# Patient Record
Sex: MALE | Race: WHITE | HISPANIC OR LATINO | Employment: OTHER | URBAN - METROPOLITAN AREA
[De-identification: names, ages, dates, MRNs, and addresses within clinical notes are randomized per-mention and may not be internally consistent; named-entity substitution may affect disease eponyms.]

---

## 2019-09-09 PROCEDURE — 99284 EMERGENCY DEPT VISIT MOD MDM: CPT | Mod: 25

## 2019-09-09 PROCEDURE — 96374 THER/PROPH/DIAG INJ IV PUSH: CPT

## 2019-09-10 ENCOUNTER — HOSPITAL ENCOUNTER (EMERGENCY)
Facility: HOSPITAL | Age: 59
Discharge: HOME | End: 2019-09-10
Attending: EMERGENCY MEDICINE
Payer: MEDICAID

## 2019-09-10 VITALS
DIASTOLIC BLOOD PRESSURE: 80 MMHG | TEMPERATURE: 99 F | RESPIRATION RATE: 17 BRPM | HEART RATE: 83 BPM | WEIGHT: 225 LBS | SYSTOLIC BLOOD PRESSURE: 169 MMHG | OXYGEN SATURATION: 95 % | HEIGHT: 67 IN | BODY MASS INDEX: 35.31 KG/M2

## 2019-09-10 DIAGNOSIS — S76.211A GROIN STRAIN, RIGHT, INITIAL ENCOUNTER: Primary | ICD-10-CM

## 2019-09-10 DIAGNOSIS — M79.606 LEG PAIN: ICD-10-CM

## 2019-09-10 DIAGNOSIS — I10 HYPERTENSION, UNSPECIFIED TYPE: ICD-10-CM

## 2019-09-10 LAB
ALBUMIN SERPL BCP-MCNC: 4.3 G/DL (ref 3.5–5.2)
ALP SERPL-CCNC: 92 U/L (ref 55–135)
ALT SERPL W/O P-5'-P-CCNC: 54 U/L (ref 10–44)
ANION GAP SERPL CALC-SCNC: 14 MMOL/L (ref 8–16)
AST SERPL-CCNC: 32 U/L (ref 10–40)
BASOPHILS # BLD AUTO: 0.02 K/UL (ref 0–0.2)
BASOPHILS NFR BLD: 0.3 % (ref 0–1.9)
BILIRUB SERPL-MCNC: 0.4 MG/DL (ref 0.1–1)
BILIRUB UR QL STRIP: NEGATIVE
BUN SERPL-MCNC: 16 MG/DL (ref 6–20)
CALCIUM SERPL-MCNC: 9.4 MG/DL (ref 8.7–10.5)
CHLORIDE SERPL-SCNC: 105 MMOL/L (ref 95–110)
CK SERPL-CCNC: 126 U/L (ref 20–200)
CLARITY UR: CLEAR
CO2 SERPL-SCNC: 23 MMOL/L (ref 23–29)
COLOR UR: YELLOW
CREAT SERPL-MCNC: 0.8 MG/DL (ref 0.5–1.4)
DIFFERENTIAL METHOD: NORMAL
EOSINOPHIL # BLD AUTO: 0.1 K/UL (ref 0–0.5)
EOSINOPHIL NFR BLD: 1 % (ref 0–8)
ERYTHROCYTE [DISTWIDTH] IN BLOOD BY AUTOMATED COUNT: 13.1 % (ref 11.5–14.5)
EST. GFR  (AFRICAN AMERICAN): >60 ML/MIN/1.73 M^2
EST. GFR  (NON AFRICAN AMERICAN): >60 ML/MIN/1.73 M^2
GLUCOSE SERPL-MCNC: 122 MG/DL (ref 70–110)
GLUCOSE UR QL STRIP: NEGATIVE
HCT VFR BLD AUTO: 41.8 % (ref 40–54)
HGB BLD-MCNC: 14.3 G/DL (ref 14–18)
HGB UR QL STRIP: NEGATIVE
KETONES UR QL STRIP: NEGATIVE
LEUKOCYTE ESTERASE UR QL STRIP: NEGATIVE
LYMPHOCYTES # BLD AUTO: 1.5 K/UL (ref 1–4.8)
LYMPHOCYTES NFR BLD: 21.3 % (ref 18–48)
MCH RBC QN AUTO: 30.8 PG (ref 27–31)
MCHC RBC AUTO-ENTMCNC: 34.2 G/DL (ref 32–36)
MCV RBC AUTO: 90 FL (ref 82–98)
MONOCYTES # BLD AUTO: 0.5 K/UL (ref 0.3–1)
MONOCYTES NFR BLD: 6.7 % (ref 4–15)
NEUTROPHILS # BLD AUTO: 4.9 K/UL (ref 1.8–7.7)
NEUTROPHILS NFR BLD: 70.7 % (ref 38–73)
NITRITE UR QL STRIP: NEGATIVE
PH UR STRIP: 7 [PH] (ref 5–8)
PLATELET # BLD AUTO: 196 K/UL (ref 150–350)
PMV BLD AUTO: 9.9 FL (ref 9.2–12.9)
POTASSIUM SERPL-SCNC: 3 MMOL/L (ref 3.5–5.1)
PROT SERPL-MCNC: 7.9 G/DL (ref 6–8.4)
PROT UR QL STRIP: NEGATIVE
RBC # BLD AUTO: 4.65 M/UL (ref 4.6–6.2)
SODIUM SERPL-SCNC: 142 MMOL/L (ref 136–145)
SP GR UR STRIP: 1.02 (ref 1–1.03)
URN SPEC COLLECT METH UR: NORMAL
UROBILINOGEN UR STRIP-ACNC: NEGATIVE EU/DL
WBC # BLD AUTO: 6.99 K/UL (ref 3.9–12.7)

## 2019-09-10 PROCEDURE — 63600175 PHARM REV CODE 636 W HCPCS: Performed by: EMERGENCY MEDICINE

## 2019-09-10 PROCEDURE — 25000003 PHARM REV CODE 250: Performed by: EMERGENCY MEDICINE

## 2019-09-10 PROCEDURE — 80053 COMPREHEN METABOLIC PANEL: CPT

## 2019-09-10 PROCEDURE — 85025 COMPLETE CBC W/AUTO DIFF WBC: CPT

## 2019-09-10 PROCEDURE — 81003 URINALYSIS AUTO W/O SCOPE: CPT

## 2019-09-10 PROCEDURE — 82550 ASSAY OF CK (CPK): CPT

## 2019-09-10 RX ORDER — KETOROLAC TROMETHAMINE 30 MG/ML
30 INJECTION, SOLUTION INTRAMUSCULAR; INTRAVENOUS
Status: COMPLETED | OUTPATIENT
Start: 2019-09-10 | End: 2019-09-10

## 2019-09-10 RX ORDER — POTASSIUM CHLORIDE 20 MEQ/1
40 TABLET, EXTENDED RELEASE ORAL
Status: COMPLETED | OUTPATIENT
Start: 2019-09-10 | End: 2019-09-10

## 2019-09-10 RX ORDER — NAPROXEN 500 MG/1
500 TABLET ORAL 2 TIMES DAILY WITH MEALS
Qty: 20 TABLET | Refills: 0 | Status: SHIPPED | OUTPATIENT
Start: 2019-09-10 | End: 2019-09-15

## 2019-09-10 RX ORDER — CYCLOBENZAPRINE HCL 10 MG
10 TABLET ORAL 3 TIMES DAILY PRN
Qty: 20 TABLET | Refills: 0 | Status: SHIPPED | OUTPATIENT
Start: 2019-09-10 | End: 2019-09-20

## 2019-09-10 RX ORDER — AMLODIPINE BESYLATE 5 MG/1
10 TABLET ORAL
Status: COMPLETED | OUTPATIENT
Start: 2019-09-10 | End: 2019-09-10

## 2019-09-10 RX ADMIN — AMLODIPINE BESYLATE 10 MG: 5 TABLET ORAL at 04:09

## 2019-09-10 RX ADMIN — POTASSIUM CHLORIDE 40 MEQ: 1500 TABLET, EXTENDED RELEASE ORAL at 04:09

## 2019-09-10 RX ADMIN — KETOROLAC TROMETHAMINE 30 MG: 30 INJECTION, SOLUTION INTRAMUSCULAR; INTRAVENOUS at 04:09

## 2019-09-10 NOTE — ED PROVIDER NOTES
Encounter Date: 9/9/2019    SCRIBE #1 NOTE: I, Alka Henson, am scribing for, and in the presence of,  Tim Hernandez MD. I have scribed the following portions of the note - Other sections scribed: HPI, ROS, PE.       History     Chief Complaint   Patient presents with    Leg Pain     Pt reports pain in his inner right thight that has been intermittent x1. Reports area may be a little swollen. Reports he played golf close to when the pain started but has a hx of blood clots     CC: Leg Pain    HPI: The patient is a 58 y.o male who presents to the ED complaining of RLE pain that began 2 weeks ago, went away and returned yesterday. Pain is located in the medial right thigh. Patient states that pain may have been exacerbated by excessive golf playing yesterday. Pain is also exacerbated when he extends the groin muscle. He denies taking pain medication to alleviate symptoms. He also reports of bilateral lower extremity swelling for the past 9 months. Hx of PE, possibly caused by thrombus from left knee complication. He was on a blood thinners for 3 yrs. Patient states that he walks often. No SOB or orthopnea. Patient admits to gaining 24 lbs in the last 4 months due to diet change. PMHx of HTN for which he takes amlodipine. No Hx of renal, liver, or cardiac complications.         The history is provided by the patient. No  was used.     Review of patient's allergies indicates:  No Known Allergies  Past Medical History:   Diagnosis Date    Anxiety     HTN (hypertension)     Hypercholesteremia     Pulmonary embolism      History reviewed. No pertinent surgical history.  History reviewed. No pertinent family history.  Social History     Tobacco Use    Smoking status: Former Smoker    Smokeless tobacco: Never Used   Substance Use Topics    Alcohol use: No    Drug use: Never     Review of Systems   Constitutional: Negative for fever.   HENT: Negative for sore throat.    Respiratory: Negative  for shortness of breath.    Cardiovascular: Negative for chest pain.   Gastrointestinal: Negative for nausea.   Genitourinary: Negative for dysuria.   Musculoskeletal: Positive for myalgias (Left medial thigh). Negative for back pain.   Skin: Negative for rash.   Neurological: Negative for weakness.   Psychiatric/Behavioral: Negative for confusion.       Physical Exam     Initial Vitals [09/09/19 2341]   BP Pulse Resp Temp SpO2   (!) 206/93 86 16 100.2 °F (37.9 °C) 97 %      MAP       --         Physical Exam    Nursing note and vitals reviewed.  Constitutional: He appears well-developed and well-nourished. He is not diaphoretic. No distress.   HENT:   Head: Normocephalic and atraumatic.   Eyes: Conjunctivae and EOM are normal. Pupils are equal, round, and reactive to light.   Neck: Normal range of motion. Neck supple.   Cardiovascular: Normal rate and regular rhythm.   Pulmonary/Chest: Breath sounds normal. No respiratory distress.   Abdominal: Soft. Bowel sounds are normal.   Musculoskeletal: Normal range of motion. He exhibits no edema or tenderness.   Tenderness at right adductor muscle insertion point.  No mass. No erythema.  No fluctuance.   Neurological: He is alert and oriented to person, place, and time.   Skin: Skin is warm and dry.   Psychiatric: He has a normal mood and affect.         ED Course   Procedures  Labs Reviewed   COMPREHENSIVE METABOLIC PANEL - Abnormal; Notable for the following components:       Result Value    Potassium 3.0 (*)     Glucose 122 (*)     ALT 54 (*)     All other components within normal limits   CBC W/ AUTO DIFFERENTIAL   CK   URINALYSIS, REFLEX TO URINE CULTURE    Narrative:     Preferred Collection Type->Urine, Clean Catch          Imaging Results          US Lower Extremity Veins Right (Final result)  Result time 09/10/19 03:04:18    Final result by Reyes Ye MD (09/10/19 03:04:18)                 Impression:      No evidence of deep venous thrombosis in the right  lower extremity.      Electronically signed by: Reyes Ye MD  Date:    09/10/2019  Time:    03:04             Narrative:    EXAMINATION:  US LOWER EXTREMITY VEINS RIGHT    CLINICAL HISTORY:  Pain in leg, unspecified.    TECHNIQUE:  Duplex and color flow Doppler evaluation and graded compression of the right lower extremity veins was performed.    COMPARISON:  None.    FINDINGS:  Right thigh veins: The common femoral, femoral, popliteal, upper greater saphenous, and deep femoral veins are patent and free of thrombus. The veins are normally compressible and have normal phasic flow and augmentation response.    Right calf veins: The visualized calf veins are patent.    Contralateral CFV: The contralateral (left) common femoral vein is patent and free of thrombus.    Miscellaneous: Mild soft tissue edema in the right calf.                                 Medical Decision Making:   History:   Old Medical Records: I decided to obtain old medical records.   Ultrasound shows no evidence of DVT.  Lab work shows no abnormalities of the hypokalemia.  P.o. potassium given the emergency room.  Patient's blood pressure trending downward.  He has not taking his Norvasc today.  Normally takes at night.  Will give his Norvasc as well.  Will treat with anti-inflammatories and muscle relaxers and follow up with primary care or Orthopedics.          Scribe Attestation:   Scribe #1: I performed the above scribed service and the documentation accurately describes the services I performed. I attest to the accuracy of the note.              I, Tim Hernandez, personally performed the services described in this documentation. All medical record entries made by the scribe were at my direction and in my presence.  I have reviewed the chart and agree that the record reflects my personal performance and is accurate and complete.    Clinical Impression:       ICD-10-CM ICD-9-CM   1. Groin strain, right, initial encounter S76.211A 848.8    2. Leg pain M79.606 729.5   3. Hypertension, unspecified type I10 401.9                                Tim Hernandez MD  09/10/19 0418

## 2021-02-17 ENCOUNTER — HOSPITAL ENCOUNTER (EMERGENCY)
Facility: HOSPITAL | Age: 61
Discharge: HOME | End: 2021-02-17
Attending: EMERGENCY MEDICINE
Payer: MEDICAID

## 2021-02-17 VITALS
RESPIRATION RATE: 17 BRPM | BODY MASS INDEX: 36.1 KG/M2 | WEIGHT: 230 LBS | HEIGHT: 67 IN | OXYGEN SATURATION: 95 % | SYSTOLIC BLOOD PRESSURE: 165 MMHG | HEART RATE: 83 BPM | DIASTOLIC BLOOD PRESSURE: 79 MMHG | TEMPERATURE: 99 F

## 2021-02-17 DIAGNOSIS — R01.1 MURMUR, HEART: Primary | ICD-10-CM

## 2021-02-17 DIAGNOSIS — R07.9 CHEST PAIN: ICD-10-CM

## 2021-02-17 DIAGNOSIS — E87.6 HYPOKALEMIA: ICD-10-CM

## 2021-02-17 DIAGNOSIS — I10 HYPERTENSION, UNSPECIFIED TYPE: ICD-10-CM

## 2021-02-17 LAB
ALBUMIN SERPL BCP-MCNC: 4.4 G/DL (ref 3.5–5.2)
ALP SERPL-CCNC: 101 U/L (ref 55–135)
ALT SERPL W/O P-5'-P-CCNC: 32 U/L (ref 10–44)
ANION GAP SERPL CALC-SCNC: 15 MMOL/L (ref 8–16)
AST SERPL-CCNC: 24 U/L (ref 10–40)
BASOPHILS # BLD AUTO: 0.04 K/UL (ref 0–0.2)
BASOPHILS NFR BLD: 0.5 % (ref 0–1.9)
BILIRUB SERPL-MCNC: 0.5 MG/DL (ref 0.1–1)
BILIRUB UR QL STRIP: NEGATIVE
BNP SERPL-MCNC: <10 PG/ML (ref 0–99)
BUN SERPL-MCNC: 11 MG/DL (ref 6–20)
CALCIUM SERPL-MCNC: 9.3 MG/DL (ref 8.7–10.5)
CHLORIDE SERPL-SCNC: 103 MMOL/L (ref 95–110)
CLARITY UR: CLEAR
CO2 SERPL-SCNC: 26 MMOL/L (ref 23–29)
COLOR UR: YELLOW
CREAT SERPL-MCNC: 0.8 MG/DL (ref 0.5–1.4)
CTP QC/QA: YES
D DIMER PPP IA.FEU-MCNC: 0.28 MG/L FEU
DIFFERENTIAL METHOD: ABNORMAL
EOSINOPHIL # BLD AUTO: 0.2 K/UL (ref 0–0.5)
EOSINOPHIL NFR BLD: 1.9 % (ref 0–8)
ERYTHROCYTE [DISTWIDTH] IN BLOOD BY AUTOMATED COUNT: 12.3 % (ref 11.5–14.5)
EST. GFR  (AFRICAN AMERICAN): >60 ML/MIN/1.73 M^2
EST. GFR  (NON AFRICAN AMERICAN): >60 ML/MIN/1.73 M^2
GLUCOSE SERPL-MCNC: 100 MG/DL (ref 70–110)
GLUCOSE UR QL STRIP: NEGATIVE
HCT VFR BLD AUTO: 44.3 % (ref 40–54)
HGB BLD-MCNC: 15.6 G/DL (ref 14–18)
HGB UR QL STRIP: NEGATIVE
IMM GRANULOCYTES # BLD AUTO: 0.06 K/UL (ref 0–0.04)
IMM GRANULOCYTES NFR BLD AUTO: 0.7 % (ref 0–0.5)
KETONES UR QL STRIP: NEGATIVE
LEUKOCYTE ESTERASE UR QL STRIP: NEGATIVE
LYMPHOCYTES # BLD AUTO: 2.3 K/UL (ref 1–4.8)
LYMPHOCYTES NFR BLD: 28.3 % (ref 18–48)
MCH RBC QN AUTO: 31 PG (ref 27–31)
MCHC RBC AUTO-ENTMCNC: 35.2 G/DL (ref 32–36)
MCV RBC AUTO: 88 FL (ref 82–98)
MONOCYTES # BLD AUTO: 0.5 K/UL (ref 0.3–1)
MONOCYTES NFR BLD: 6.7 % (ref 4–15)
NEUTROPHILS # BLD AUTO: 5 K/UL (ref 1.8–7.7)
NEUTROPHILS NFR BLD: 61.9 % (ref 38–73)
NITRITE UR QL STRIP: NEGATIVE
NRBC BLD-RTO: 0 /100 WBC
PH UR STRIP: 7 [PH] (ref 5–8)
PLATELET # BLD AUTO: 209 K/UL (ref 150–350)
PMV BLD AUTO: 10.8 FL (ref 9.2–12.9)
POTASSIUM SERPL-SCNC: 2.6 MMOL/L (ref 3.5–5.1)
PROT SERPL-MCNC: 8.5 G/DL (ref 6–8.4)
PROT UR QL STRIP: ABNORMAL
RBC # BLD AUTO: 5.03 M/UL (ref 4.6–6.2)
SARS-COV-2 RDRP RESP QL NAA+PROBE: NEGATIVE
SODIUM SERPL-SCNC: 144 MMOL/L (ref 136–145)
SP GR UR STRIP: 1.01 (ref 1–1.03)
TROPONIN I SERPL DL<=0.01 NG/ML-MCNC: 0.01 NG/ML (ref 0–0.03)
TROPONIN I SERPL DL<=0.01 NG/ML-MCNC: 0.01 NG/ML (ref 0–0.03)
URN SPEC COLLECT METH UR: ABNORMAL
UROBILINOGEN UR STRIP-ACNC: NEGATIVE EU/DL
WBC # BLD AUTO: 8.07 K/UL (ref 3.9–12.7)

## 2021-02-17 PROCEDURE — 85379 FIBRIN DEGRADATION QUANT: CPT

## 2021-02-17 PROCEDURE — 93010 ELECTROCARDIOGRAM REPORT: CPT | Mod: ,,, | Performed by: INTERNAL MEDICINE

## 2021-02-17 PROCEDURE — 81003 URINALYSIS AUTO W/O SCOPE: CPT

## 2021-02-17 PROCEDURE — 85025 COMPLETE CBC W/AUTO DIFF WBC: CPT

## 2021-02-17 PROCEDURE — 80053 COMPREHEN METABOLIC PANEL: CPT

## 2021-02-17 PROCEDURE — 93010 EKG 12-LEAD: ICD-10-PCS | Mod: ,,, | Performed by: INTERNAL MEDICINE

## 2021-02-17 PROCEDURE — 96361 HYDRATE IV INFUSION ADD-ON: CPT

## 2021-02-17 PROCEDURE — 99285 EMERGENCY DEPT VISIT HI MDM: CPT | Mod: 25

## 2021-02-17 PROCEDURE — 96366 THER/PROPH/DIAG IV INF ADDON: CPT

## 2021-02-17 PROCEDURE — 63600175 PHARM REV CODE 636 W HCPCS: Performed by: EMERGENCY MEDICINE

## 2021-02-17 PROCEDURE — 93005 ELECTROCARDIOGRAM TRACING: CPT

## 2021-02-17 PROCEDURE — 84484 ASSAY OF TROPONIN QUANT: CPT

## 2021-02-17 PROCEDURE — U0002 COVID-19 LAB TEST NON-CDC: HCPCS | Performed by: EMERGENCY MEDICINE

## 2021-02-17 PROCEDURE — 83880 ASSAY OF NATRIURETIC PEPTIDE: CPT

## 2021-02-17 PROCEDURE — 25000003 PHARM REV CODE 250: Performed by: EMERGENCY MEDICINE

## 2021-02-17 PROCEDURE — 96365 THER/PROPH/DIAG IV INF INIT: CPT

## 2021-02-17 RX ORDER — POTASSIUM CHLORIDE 20 MEQ/1
20 TABLET, EXTENDED RELEASE ORAL DAILY
Qty: 10 TABLET | Refills: 0 | Status: SHIPPED | OUTPATIENT
Start: 2021-02-17 | End: 2021-02-27

## 2021-02-17 RX ORDER — SODIUM CHLORIDE 9 MG/ML
INJECTION, SOLUTION INTRAVENOUS
Status: COMPLETED | OUTPATIENT
Start: 2021-02-17 | End: 2021-02-17

## 2021-02-17 RX ORDER — NAPROXEN SODIUM 220 MG/1
81 TABLET, FILM COATED ORAL
Status: COMPLETED | OUTPATIENT
Start: 2021-02-17 | End: 2021-02-17

## 2021-02-17 RX ORDER — POTASSIUM CHLORIDE 7.45 MG/ML
10 INJECTION INTRAVENOUS
Status: COMPLETED | OUTPATIENT
Start: 2021-02-17 | End: 2021-02-17

## 2021-02-17 RX ADMIN — SODIUM CHLORIDE: 0.9 INJECTION, SOLUTION INTRAVENOUS at 03:02

## 2021-02-17 RX ADMIN — POTASSIUM CHLORIDE 10 MEQ: 7.46 INJECTION, SOLUTION INTRAVENOUS at 04:02

## 2021-02-17 RX ADMIN — ASPIRIN 81 MG: 81 TABLET, CHEWABLE ORAL at 03:02

## 2021-02-17 RX ADMIN — POTASSIUM CHLORIDE 10 MEQ: 7.46 INJECTION, SOLUTION INTRAVENOUS at 05:02

## 2021-03-23 ENCOUNTER — IMMUNIZATION (OUTPATIENT)
Dept: OBSTETRICS AND GYNECOLOGY | Facility: CLINIC | Age: 61
End: 2021-03-23
Payer: MEDICAID

## 2021-03-23 DIAGNOSIS — Z23 NEED FOR VACCINATION: Primary | ICD-10-CM

## 2021-03-23 PROCEDURE — 0001A COVID-19, MRNA, LNP-S, PF, 30 MCG/0.3 ML DOSE VACCINE: ICD-10-PCS | Mod: CV19,,, | Performed by: FAMILY MEDICINE

## 2021-03-23 PROCEDURE — 91300 COVID-19, MRNA, LNP-S, PF, 30 MCG/0.3 ML DOSE VACCINE: CPT | Mod: ,,, | Performed by: FAMILY MEDICINE

## 2021-03-23 PROCEDURE — 91300 COVID-19, MRNA, LNP-S, PF, 30 MCG/0.3 ML DOSE VACCINE: ICD-10-PCS | Mod: ,,, | Performed by: FAMILY MEDICINE

## 2021-03-23 PROCEDURE — 0001A COVID-19, MRNA, LNP-S, PF, 30 MCG/0.3 ML DOSE VACCINE: CPT | Mod: CV19,,, | Performed by: FAMILY MEDICINE

## 2021-04-01 ENCOUNTER — HOSPITAL ENCOUNTER (EMERGENCY)
Facility: HOSPITAL | Age: 61
Discharge: HOME | End: 2021-04-01
Attending: EMERGENCY MEDICINE
Payer: MEDICAID

## 2021-04-01 VITALS
DIASTOLIC BLOOD PRESSURE: 66 MMHG | SYSTOLIC BLOOD PRESSURE: 184 MMHG | HEART RATE: 70 BPM | TEMPERATURE: 99 F | HEIGHT: 67 IN | OXYGEN SATURATION: 98 % | WEIGHT: 215 LBS | RESPIRATION RATE: 18 BRPM | BODY MASS INDEX: 33.74 KG/M2

## 2021-04-01 DIAGNOSIS — S09.90XA INJURY OF HEAD, INITIAL ENCOUNTER: ICD-10-CM

## 2021-04-01 DIAGNOSIS — S01.01XA LACERATION OF SCALP, INITIAL ENCOUNTER: Primary | ICD-10-CM

## 2021-04-01 PROCEDURE — 12002 RPR S/N/AX/GEN/TRNK2.6-7.5CM: CPT | Mod: ,,, | Performed by: EMERGENCY MEDICINE

## 2021-04-01 PROCEDURE — 99282 PR EMERGENCY DEPT VISIT,LEVEL II: ICD-10-PCS | Mod: 25,,, | Performed by: EMERGENCY MEDICINE

## 2021-04-01 PROCEDURE — 12001 RPR S/N/AX/GEN/TRNK 2.5CM/<: CPT

## 2021-04-01 PROCEDURE — 99284 EMERGENCY DEPT VISIT MOD MDM: CPT | Mod: 25

## 2021-04-01 PROCEDURE — 99282 EMERGENCY DEPT VISIT SF MDM: CPT | Mod: 25,,, | Performed by: EMERGENCY MEDICINE

## 2021-04-01 PROCEDURE — 12002 PR RESUP NPTERF WND BODY 2.6-7.5 CM: ICD-10-PCS | Mod: ,,, | Performed by: EMERGENCY MEDICINE

## 2021-04-13 ENCOUNTER — IMMUNIZATION (OUTPATIENT)
Dept: OBSTETRICS AND GYNECOLOGY | Facility: CLINIC | Age: 61
End: 2021-04-13
Payer: MEDICAID

## 2021-04-13 DIAGNOSIS — Z23 NEED FOR VACCINATION: Primary | ICD-10-CM

## 2021-04-13 PROCEDURE — 91300 COVID-19, MRNA, LNP-S, PF, 30 MCG/0.3 ML DOSE VACCINE: ICD-10-PCS | Mod: ,,, | Performed by: FAMILY MEDICINE

## 2021-04-13 PROCEDURE — 0002A COVID-19, MRNA, LNP-S, PF, 30 MCG/0.3 ML DOSE VACCINE: ICD-10-PCS | Mod: CV19,,, | Performed by: FAMILY MEDICINE

## 2021-04-13 PROCEDURE — 91300 COVID-19, MRNA, LNP-S, PF, 30 MCG/0.3 ML DOSE VACCINE: CPT | Mod: ,,, | Performed by: FAMILY MEDICINE

## 2021-04-13 PROCEDURE — 0002A COVID-19, MRNA, LNP-S, PF, 30 MCG/0.3 ML DOSE VACCINE: CPT | Mod: CV19,,, | Performed by: FAMILY MEDICINE

## 2022-01-01 ENCOUNTER — ANESTHESIA EVENT (OUTPATIENT)
Dept: CARDIOLOGY | Facility: HOSPITAL | Age: 62
DRG: 283 | End: 2022-01-01
Payer: MEDICAID

## 2022-01-01 ENCOUNTER — ANESTHESIA (OUTPATIENT)
Dept: CARDIOLOGY | Facility: HOSPITAL | Age: 62
DRG: 283 | End: 2022-01-01
Payer: MEDICAID

## 2022-01-01 ENCOUNTER — HOSPITAL ENCOUNTER (INPATIENT)
Facility: HOSPITAL | Age: 62
LOS: 4 days | DRG: 283 | End: 2022-12-05
Attending: STUDENT IN AN ORGANIZED HEALTH CARE EDUCATION/TRAINING PROGRAM | Admitting: HOSPITALIST
Payer: MEDICAID

## 2022-01-01 ENCOUNTER — HOSPITAL ENCOUNTER (EMERGENCY)
Facility: HOSPITAL | Age: 62
Discharge: HOME | End: 2022-02-23
Attending: EMERGENCY MEDICINE
Payer: MEDICAID

## 2022-01-01 ENCOUNTER — HOSPITAL ENCOUNTER (EMERGENCY)
Facility: HOSPITAL | Age: 62
Discharge: HOME | End: 2022-03-11
Attending: EMERGENCY MEDICINE
Payer: MEDICAID

## 2022-01-01 VITALS
RESPIRATION RATE: 18 BRPM | HEART RATE: 74 BPM | OXYGEN SATURATION: 97 % | SYSTOLIC BLOOD PRESSURE: 187 MMHG | WEIGHT: 225 LBS | HEIGHT: 67 IN | TEMPERATURE: 99 F | BODY MASS INDEX: 35.31 KG/M2 | DIASTOLIC BLOOD PRESSURE: 99 MMHG

## 2022-01-01 VITALS
HEIGHT: 68 IN | TEMPERATURE: 100 F | RESPIRATION RATE: 18 BRPM | OXYGEN SATURATION: 97 % | BODY MASS INDEX: 34.1 KG/M2 | SYSTOLIC BLOOD PRESSURE: 177 MMHG | WEIGHT: 225 LBS | DIASTOLIC BLOOD PRESSURE: 99 MMHG | HEART RATE: 77 BPM

## 2022-01-01 VITALS
BODY MASS INDEX: 38.44 KG/M2 | HEART RATE: 57 BPM | WEIGHT: 244.94 LBS | OXYGEN SATURATION: 91 % | HEIGHT: 67 IN | SYSTOLIC BLOOD PRESSURE: 123 MMHG | TEMPERATURE: 99 F | DIASTOLIC BLOOD PRESSURE: 60 MMHG | RESPIRATION RATE: 18 BRPM

## 2022-01-01 DIAGNOSIS — Z01.89 ENCOUNTER FOR IMAGING STUDY TO CONFIRM OROGASTRIC (OG) TUBE PLACEMENT: ICD-10-CM

## 2022-01-01 DIAGNOSIS — R56.9 SEIZURE: ICD-10-CM

## 2022-01-01 DIAGNOSIS — I50.43 ACUTE ON CHRONIC COMBINED SYSTOLIC AND DIASTOLIC CHF (CONGESTIVE HEART FAILURE): ICD-10-CM

## 2022-01-01 DIAGNOSIS — Z76.0 MEDICATION REFILL: ICD-10-CM

## 2022-01-01 DIAGNOSIS — Z76.0 MEDICATION REFILL: Primary | ICD-10-CM

## 2022-01-01 DIAGNOSIS — M25.562 ACUTE PAIN OF LEFT KNEE: Primary | ICD-10-CM

## 2022-01-01 DIAGNOSIS — I46.9 CARDIAC ARREST: Primary | ICD-10-CM

## 2022-01-01 DIAGNOSIS — Z95.828 STATUS POST PICC CENTRAL LINE PLACEMENT: ICD-10-CM

## 2022-01-01 DIAGNOSIS — M25.562 KNEE PAIN, LEFT: ICD-10-CM

## 2022-01-01 DIAGNOSIS — Z97.8 ENDOTRACHEALLY INTUBATED: ICD-10-CM

## 2022-01-01 LAB
ALBUMIN SERPL BCP-MCNC: 3.3 G/DL (ref 3.5–5.2)
ALBUMIN SERPL BCP-MCNC: 3.5 G/DL (ref 3.5–5.2)
ALBUMIN SERPL BCP-MCNC: 3.8 G/DL (ref 3.5–5.2)
ALLENS TEST: ABNORMAL
ALLENS TEST: NORMAL
ALP SERPL-CCNC: 101 U/L (ref 55–135)
ALP SERPL-CCNC: 133 U/L (ref 55–135)
ALP SERPL-CCNC: 82 U/L (ref 55–135)
ALT SERPL W/O P-5'-P-CCNC: 112 U/L (ref 10–44)
ALT SERPL W/O P-5'-P-CCNC: 146 U/L (ref 10–44)
ALT SERPL W/O P-5'-P-CCNC: 55 U/L (ref 10–44)
AMPHET+METHAMPHET UR QL: NEGATIVE
ANION GAP SERPL CALC-SCNC: 10 MMOL/L (ref 8–16)
ANION GAP SERPL CALC-SCNC: 11 MMOL/L (ref 8–16)
ANION GAP SERPL CALC-SCNC: 12 MMOL/L (ref 8–16)
ANION GAP SERPL CALC-SCNC: 13 MMOL/L (ref 8–16)
ANION GAP SERPL CALC-SCNC: 16 MMOL/L (ref 8–16)
ANION GAP SERPL CALC-SCNC: 16 MMOL/L (ref 8–16)
ANION GAP SERPL CALC-SCNC: 19 MMOL/L (ref 8–16)
AORTIC ROOT ANNULUS: 2.94 CM
AORTIC VALVE CUSP SEPERATION: 2.25 CM
APTT BLDCRRT: 32.1 SEC (ref 21–32)
APTT BLDCRRT: 35.3 SEC (ref 21–32)
APTT BLDCRRT: 35.9 SEC (ref 21–32)
APTT BLDCRRT: 36.5 SEC (ref 21–32)
APTT BLDCRRT: 38.5 SEC (ref 21–32)
APTT BLDCRRT: 39.6 SEC (ref 21–32)
APTT BLDCRRT: 39.7 SEC (ref 21–32)
APTT BLDCRRT: 40.7 SEC (ref 21–32)
APTT BLDCRRT: 42.7 SEC (ref 21–32)
APTT BLDCRRT: 51.7 SEC (ref 21–32)
ASCENDING AORTA: 3.49 CM
AST SERPL-CCNC: 176 U/L (ref 10–40)
AST SERPL-CCNC: 45 U/L (ref 10–40)
AST SERPL-CCNC: 472 U/L (ref 10–40)
AV INDEX (PROSTH): 0.86
AV MEAN GRADIENT: 4 MMHG
AV PEAK GRADIENT: 7 MMHG
AV VALVE AREA: 2.81 CM2
AV VELOCITY RATIO: 0.84
BACTERIA #/AREA URNS HPF: ABNORMAL /HPF
BACTERIA UR CULT: NO GROWTH
BARBITURATES UR QL SCN>200 NG/ML: NEGATIVE
BASOPHILS # BLD AUTO: 0.02 K/UL (ref 0–0.2)
BASOPHILS # BLD AUTO: 0.03 K/UL (ref 0–0.2)
BASOPHILS NFR BLD: 0.2 % (ref 0–1.9)
BASOPHILS NFR BLD: 0.3 % (ref 0–1.9)
BENZODIAZ UR QL SCN>200 NG/ML: ABNORMAL
BILIRUB SERPL-MCNC: 0.5 MG/DL (ref 0.1–1)
BILIRUB SERPL-MCNC: 0.7 MG/DL (ref 0.1–1)
BILIRUB SERPL-MCNC: 1.3 MG/DL (ref 0.1–1)
BILIRUB UR QL STRIP: NEGATIVE
BNP SERPL-MCNC: 21 PG/ML (ref 0–99)
BSA FOR ECHO PROCEDURE: 2.29 M2
BUN SERPL-MCNC: 18 MG/DL (ref 6–20)
BUN SERPL-MCNC: 19 MG/DL (ref 6–30)
BUN SERPL-MCNC: 26 MG/DL (ref 6–20)
BUN SERPL-MCNC: 35 MG/DL (ref 6–20)
BUN SERPL-MCNC: 37 MG/DL (ref 6–20)
BUN SERPL-MCNC: 41 MG/DL (ref 6–20)
BUN SERPL-MCNC: 42 MG/DL (ref 6–20)
BZE UR QL SCN: NEGATIVE
CALCIUM SERPL-MCNC: 8 MG/DL (ref 8.7–10.5)
CALCIUM SERPL-MCNC: 8.3 MG/DL (ref 8.7–10.5)
CALCIUM SERPL-MCNC: 8.6 MG/DL (ref 8.7–10.5)
CALCIUM SERPL-MCNC: 9.2 MG/DL (ref 8.7–10.5)
CANNABINOIDS UR QL SCN: NEGATIVE
CHLORIDE SERPL-SCNC: 104 MMOL/L (ref 95–110)
CHLORIDE SERPL-SCNC: 105 MMOL/L (ref 95–110)
CHLORIDE SERPL-SCNC: 106 MMOL/L (ref 95–110)
CLARITY UR: ABNORMAL
CO2 SERPL-SCNC: 18 MMOL/L (ref 23–29)
CO2 SERPL-SCNC: 23 MMOL/L (ref 23–29)
CO2 SERPL-SCNC: 23 MMOL/L (ref 23–29)
CO2 SERPL-SCNC: 25 MMOL/L (ref 23–29)
COLOR UR: ABNORMAL
CREAT SERPL-MCNC: 1.1 MG/DL (ref 0.5–1.4)
CREAT SERPL-MCNC: 1.1 MG/DL (ref 0.5–1.4)
CREAT SERPL-MCNC: 1.9 MG/DL (ref 0.5–1.4)
CREAT SERPL-MCNC: 2.4 MG/DL (ref 0.5–1.4)
CREAT SERPL-MCNC: 2.7 MG/DL (ref 0.5–1.4)
CREAT SERPL-MCNC: 2.9 MG/DL (ref 0.5–1.4)
CREAT SERPL-MCNC: 2.9 MG/DL (ref 0.5–1.4)
CREAT UR-MCNC: 20 MG/DL (ref 23–375)
CV ECHO LV RWT: 0.55 CM
DELSYS: ABNORMAL
DELSYS: ABNORMAL
DELSYS: NORMAL
DIFFERENTIAL METHOD: ABNORMAL
DOP CALC AO PEAK VEL: 1.28 M/S
DOP CALC AO VTI: 24.5 CM
DOP CALC LVOT AREA: 3.3 CM2
DOP CALC LVOT DIAMETER: 2.04 CM
DOP CALC LVOT PEAK VEL: 1.08 M/S
DOP CALC LVOT STROKE VOLUME: 68.93 CM3
DOP CALCLVOT PEAK VEL VTI: 21.1 CM
E WAVE DECELERATION TIME: 198.6 MSEC
E/A RATIO: 0.81
E/E' RATIO: 11.45 M/S
ECHO LV POSTERIOR WALL: 1.38 CM (ref 0.6–1.1)
EJECTION FRACTION: 50 %
EOSINOPHIL # BLD AUTO: 0 K/UL (ref 0–0.5)
EOSINOPHIL # BLD AUTO: 0.1 K/UL (ref 0–0.5)
EOSINOPHIL NFR BLD: 0 % (ref 0–8)
EOSINOPHIL NFR BLD: 0 % (ref 0–8)
EOSINOPHIL NFR BLD: 0.1 % (ref 0–8)
EOSINOPHIL NFR BLD: 0.4 % (ref 0–8)
EOSINOPHIL NFR BLD: 1.6 % (ref 0–8)
ERYTHROCYTE [DISTWIDTH] IN BLOOD BY AUTOMATED COUNT: 12.6 % (ref 11.5–14.5)
ERYTHROCYTE [DISTWIDTH] IN BLOOD BY AUTOMATED COUNT: 12.6 % (ref 11.5–14.5)
ERYTHROCYTE [DISTWIDTH] IN BLOOD BY AUTOMATED COUNT: 12.7 % (ref 11.5–14.5)
ERYTHROCYTE [DISTWIDTH] IN BLOOD BY AUTOMATED COUNT: 13.2 % (ref 11.5–14.5)
ERYTHROCYTE [DISTWIDTH] IN BLOOD BY AUTOMATED COUNT: 13.4 % (ref 11.5–14.5)
ERYTHROCYTE [SEDIMENTATION RATE] IN BLOOD BY WESTERGREN METHOD: 30 MM/H
EST. GFR  (NO RACE VARIABLE): 24 ML/MIN/1.73 M^2
EST. GFR  (NO RACE VARIABLE): 24 ML/MIN/1.73 M^2
EST. GFR  (NO RACE VARIABLE): 26 ML/MIN/1.73 M^2
EST. GFR  (NO RACE VARIABLE): 30 ML/MIN/1.73 M^2
EST. GFR  (NO RACE VARIABLE): 40 ML/MIN/1.73 M^2
EST. GFR  (NO RACE VARIABLE): >60 ML/MIN/1.73 M^2
ETHANOL SERPL-MCNC: <10 MG/DL
FIO2: 100
FIO2: 40
FIO2: 70
FRACTIONAL SHORTENING: 23 % (ref 28–44)
GLUCOSE SERPL-MCNC: 113 MG/DL (ref 70–110)
GLUCOSE SERPL-MCNC: 135 MG/DL (ref 70–110)
GLUCOSE SERPL-MCNC: 140 MG/DL (ref 70–110)
GLUCOSE SERPL-MCNC: 143 MG/DL (ref 70–110)
GLUCOSE SERPL-MCNC: 147 MG/DL (ref 70–110)
GLUCOSE SERPL-MCNC: 154 MG/DL (ref 70–110)
GLUCOSE SERPL-MCNC: 174 MG/DL (ref 70–110)
GLUCOSE UR QL STRIP: ABNORMAL
HCO3 UR-SCNC: 16.3 MMOL/L (ref 24–28)
HCO3 UR-SCNC: 19.5 MMOL/L (ref 24–28)
HCO3 UR-SCNC: 23.4 MMOL/L (ref 24–28)
HCO3 UR-SCNC: 25.3 MMOL/L (ref 24–28)
HCT VFR BLD AUTO: 36.6 % (ref 40–54)
HCT VFR BLD AUTO: 38 % (ref 40–54)
HCT VFR BLD AUTO: 39.1 % (ref 40–54)
HCT VFR BLD AUTO: 39.4 % (ref 40–54)
HCT VFR BLD AUTO: 46.4 % (ref 40–54)
HCT VFR BLD CALC: 47 %PCV (ref 36–54)
HGB BLD-MCNC: 12.5 G/DL (ref 14–18)
HGB BLD-MCNC: 12.6 G/DL (ref 14–18)
HGB BLD-MCNC: 13 G/DL (ref 14–18)
HGB BLD-MCNC: 13.8 G/DL (ref 14–18)
HGB BLD-MCNC: 16 G/DL (ref 14–18)
HGB UR QL STRIP: ABNORMAL
HYALINE CASTS #/AREA URNS LPF: 0 /LPF
IMM GRANULOCYTES # BLD AUTO: 0.04 K/UL (ref 0–0.04)
IMM GRANULOCYTES # BLD AUTO: 0.04 K/UL (ref 0–0.04)
IMM GRANULOCYTES # BLD AUTO: 0.06 K/UL (ref 0–0.04)
IMM GRANULOCYTES # BLD AUTO: 0.09 K/UL (ref 0–0.04)
IMM GRANULOCYTES # BLD AUTO: 0.11 K/UL (ref 0–0.04)
IMM GRANULOCYTES NFR BLD AUTO: 0.3 % (ref 0–0.5)
IMM GRANULOCYTES NFR BLD AUTO: 0.5 % (ref 0–0.5)
IMM GRANULOCYTES NFR BLD AUTO: 0.6 % (ref 0–0.5)
IMM GRANULOCYTES NFR BLD AUTO: 0.8 % (ref 0–0.5)
IMM GRANULOCYTES NFR BLD AUTO: 1 % (ref 0–0.5)
INR PPP: 1 (ref 0.8–1.2)
INTERVENTRICULAR SEPTUM: 1.51 CM (ref 0.6–1.1)
IVC DIAMETER: 2.74 CM
KETONES UR QL STRIP: NEGATIVE
LA MAJOR: 6.9 CM
LA MINOR: 4.1 CM
LA WIDTH: 4.1 CM
LACTATE SERPL-SCNC: 2.9 MMOL/L (ref 0.5–2.2)
LACTATE SERPL-SCNC: 6.1 MMOL/L (ref 0.5–2.2)
LEFT ATRIUM SIZE: 3.8 CM
LEFT ATRIUM VOLUME INDEX: 31 ML/M2
LEFT ATRIUM VOLUME: 68.12 CM3
LEFT INTERNAL DIMENSION IN SYSTOLE: 3.83 CM (ref 2.1–4)
LEFT VENTRICLE DIASTOLIC VOLUME INDEX: 53.23 ML/M2
LEFT VENTRICLE DIASTOLIC VOLUME: 117.11 ML
LEFT VENTRICLE MASS INDEX: 138 G/M2
LEFT VENTRICLE SYSTOLIC VOLUME INDEX: 28.6 ML/M2
LEFT VENTRICLE SYSTOLIC VOLUME: 62.99 ML
LEFT VENTRICULAR INTERNAL DIMENSION IN DIASTOLE: 4.98 CM (ref 3.5–6)
LEFT VENTRICULAR MASS: 303.4 G
LEUKOCYTE ESTERASE UR QL STRIP: ABNORMAL
LV LATERAL E/E' RATIO: 10.5 M/S
LV SEPTAL E/E' RATIO: 12.6 M/S
LVOT MG: 2.82 MMHG
LVOT MV: 0.79 CM/S
LYMPHOCYTES # BLD AUTO: 0.6 K/UL (ref 1–4.8)
LYMPHOCYTES # BLD AUTO: 0.8 K/UL (ref 1–4.8)
LYMPHOCYTES # BLD AUTO: 1 K/UL (ref 1–4.8)
LYMPHOCYTES # BLD AUTO: 1.3 K/UL (ref 1–4.8)
LYMPHOCYTES # BLD AUTO: 4.7 K/UL (ref 1–4.8)
LYMPHOCYTES NFR BLD: 12.7 % (ref 18–48)
LYMPHOCYTES NFR BLD: 5.6 % (ref 18–48)
LYMPHOCYTES NFR BLD: 54.3 % (ref 18–48)
LYMPHOCYTES NFR BLD: 7.7 % (ref 18–48)
LYMPHOCYTES NFR BLD: 8.5 % (ref 18–48)
MAGNESIUM SERPL-MCNC: 2.7 MG/DL (ref 1.6–2.6)
MAGNESIUM SERPL-MCNC: 3.3 MG/DL (ref 1.6–2.6)
MAGNESIUM SERPL-MCNC: 3.7 MG/DL (ref 1.6–2.6)
MCH RBC QN AUTO: 29.6 PG (ref 27–31)
MCH RBC QN AUTO: 29.8 PG (ref 27–31)
MCH RBC QN AUTO: 31.1 PG (ref 27–31)
MCH RBC QN AUTO: 31.1 PG (ref 27–31)
MCH RBC QN AUTO: 31.3 PG (ref 27–31)
MCHC RBC AUTO-ENTMCNC: 32.9 G/DL (ref 32–36)
MCHC RBC AUTO-ENTMCNC: 33 G/DL (ref 32–36)
MCHC RBC AUTO-ENTMCNC: 34.4 G/DL (ref 32–36)
MCHC RBC AUTO-ENTMCNC: 34.5 G/DL (ref 32–36)
MCHC RBC AUTO-ENTMCNC: 35.3 G/DL (ref 32–36)
MCV RBC AUTO: 88 FL (ref 82–98)
MCV RBC AUTO: 90 FL (ref 82–98)
MCV RBC AUTO: 90 FL (ref 82–98)
MCV RBC AUTO: 91 FL (ref 82–98)
MCV RBC AUTO: 91 FL (ref 82–98)
METHADONE UR QL SCN>300 NG/ML: NEGATIVE
MICROSCOPIC COMMENT: ABNORMAL
MIN VOL: 11.9
MIN VOL: 14
MODE: ABNORMAL
MODE: ABNORMAL
MODE: NORMAL
MONOCYTES # BLD AUTO: 0.7 K/UL (ref 0.3–1)
MONOCYTES # BLD AUTO: 0.8 K/UL (ref 0.3–1)
MONOCYTES # BLD AUTO: 0.8 K/UL (ref 0.3–1)
MONOCYTES # BLD AUTO: 0.9 K/UL (ref 0.3–1)
MONOCYTES # BLD AUTO: 1 K/UL (ref 0.3–1)
MONOCYTES NFR BLD: 6.9 % (ref 4–15)
MONOCYTES NFR BLD: 7.5 % (ref 4–15)
MONOCYTES NFR BLD: 7.9 % (ref 4–15)
MONOCYTES NFR BLD: 9 % (ref 4–15)
MONOCYTES NFR BLD: 9.2 % (ref 4–15)
MV PEAK A VEL: 0.78 M/S
MV PEAK E VEL: 0.63 M/S
MV STENOSIS PRESSURE HALF TIME: 105.01 MS
MV VALVE AREA P 1/2 METHOD: 2.1 CM2
NEUTROPHILS # BLD AUTO: 10 K/UL (ref 1.8–7.7)
NEUTROPHILS # BLD AUTO: 3 K/UL (ref 1.8–7.7)
NEUTROPHILS # BLD AUTO: 7.8 K/UL (ref 1.8–7.7)
NEUTROPHILS # BLD AUTO: 9 K/UL (ref 1.8–7.7)
NEUTROPHILS # BLD AUTO: 9.5 K/UL (ref 1.8–7.7)
NEUTROPHILS NFR BLD: 34.3 % (ref 38–73)
NEUTROPHILS NFR BLD: 76.9 % (ref 38–73)
NEUTROPHILS NFR BLD: 83.1 % (ref 38–73)
NEUTROPHILS NFR BLD: 84.4 % (ref 38–73)
NEUTROPHILS NFR BLD: 85.6 % (ref 38–73)
NITRITE UR QL STRIP: NEGATIVE
NRBC BLD-RTO: 0 /100 WBC
OPIATES UR QL SCN: NEGATIVE
PCO2 BLDA: 18.1 MMHG (ref 35–45)
PCO2 BLDA: 35.9 MMHG (ref 35–45)
PCO2 BLDA: 42.9 MMHG (ref 35–45)
PCO2 BLDA: 48.4 MMHG (ref 35–45)
PCP UR QL SCN>25 NG/ML: NEGATIVE
PEEP: 5
PH SMN: 7.21 [PH] (ref 7.35–7.45)
PH SMN: 7.38 [PH] (ref 7.35–7.45)
PH SMN: 7.42 [PH] (ref 7.35–7.45)
PH SMN: 7.56 [PH] (ref 7.35–7.45)
PH UR STRIP: >8 [PH] (ref 5–8)
PHOSPHATE SERPL-MCNC: 2.3 MG/DL (ref 2.7–4.5)
PHOSPHATE SERPL-MCNC: 3.6 MG/DL (ref 2.7–4.5)
PHOSPHATE SERPL-MCNC: 7.6 MG/DL (ref 2.7–4.5)
PIP: 21
PIP: 26
PISA TR MAX VEL: 1.7 M/S
PLATELET # BLD AUTO: 145 K/UL (ref 150–450)
PLATELET # BLD AUTO: 147 K/UL (ref 150–450)
PLATELET # BLD AUTO: 160 K/UL (ref 150–450)
PLATELET # BLD AUTO: 166 K/UL (ref 150–450)
PLATELET # BLD AUTO: 179 K/UL (ref 150–450)
PMV BLD AUTO: 10.8 FL (ref 9.2–12.9)
PMV BLD AUTO: 10.9 FL (ref 9.2–12.9)
PO2 BLDA: 129 MMHG (ref 80–100)
PO2 BLDA: 249 MMHG (ref 80–100)
PO2 BLDA: 70 MMHG (ref 80–100)
PO2 BLDA: 82 MMHG (ref 80–100)
POC BE: -1 MMOL/L
POC BE: -4 MMOL/L
POC BE: -8 MMOL/L
POC BE: 0 MMOL/L
POC IONIZED CALCIUM: 1.19 MMOL/L (ref 1.06–1.42)
POC SATURATED O2: 100 % (ref 95–100)
POC SATURATED O2: 96 % (ref 95–100)
POC SATURATED O2: 96 % (ref 95–100)
POC SATURATED O2: 99 % (ref 95–100)
POC TCO2 (MEASURED): 28 MMOL/L (ref 23–29)
POC TCO2: 17 MMOL/L (ref 23–27)
POC TCO2: 21 MMOL/L (ref 23–27)
POC TCO2: 24 MMOL/L (ref 23–27)
POC TCO2: 27 MMOL/L (ref 23–27)
POCT GLUCOSE: 141 MG/DL (ref 70–110)
POCT GLUCOSE: 178 MG/DL (ref 70–110)
POTASSIUM BLD-SCNC: 2.8 MMOL/L (ref 3.5–5.1)
POTASSIUM SERPL-SCNC: 2.8 MMOL/L (ref 3.5–5.1)
POTASSIUM SERPL-SCNC: 2.9 MMOL/L (ref 3.5–5.1)
POTASSIUM SERPL-SCNC: 2.9 MMOL/L (ref 3.5–5.1)
POTASSIUM SERPL-SCNC: 3 MMOL/L (ref 3.5–5.1)
POTASSIUM SERPL-SCNC: 3 MMOL/L (ref 3.5–5.1)
POTASSIUM SERPL-SCNC: 3.2 MMOL/L (ref 3.5–5.1)
PROT SERPL-MCNC: 6.4 G/DL (ref 6–8.4)
PROT SERPL-MCNC: 6.4 G/DL (ref 6–8.4)
PROT SERPL-MCNC: 7.9 G/DL (ref 6–8.4)
PROT UR QL STRIP: ABNORMAL
PROTHROMBIN TIME: 10.9 SEC (ref 9–12.5)
PV PEAK VELOCITY: 0.85 CM/S
RA MAJOR: 5.16 CM
RA WIDTH: 3.74 CM
RBC # BLD AUTO: 4.05 M/UL (ref 4.6–6.2)
RBC # BLD AUTO: 4.2 M/UL (ref 4.6–6.2)
RBC # BLD AUTO: 4.39 M/UL (ref 4.6–6.2)
RBC # BLD AUTO: 4.44 M/UL (ref 4.6–6.2)
RBC # BLD AUTO: 5.11 M/UL (ref 4.6–6.2)
RBC #/AREA URNS HPF: >100 /HPF (ref 0–4)
SAMPLE: ABNORMAL
SAMPLE: NORMAL
SARS-COV-2 RDRP RESP QL NAA+PROBE: NEGATIVE
SINUS: 3.06 CM
SITE: ABNORMAL
SITE: NORMAL
SODIUM BLD-SCNC: 144 MMOL/L (ref 136–145)
SODIUM SERPL-SCNC: 140 MMOL/L (ref 136–145)
SODIUM SERPL-SCNC: 141 MMOL/L (ref 136–145)
SODIUM SERPL-SCNC: 143 MMOL/L (ref 136–145)
SODIUM SERPL-SCNC: 143 MMOL/L (ref 136–145)
SP GR UR STRIP: 1.01 (ref 1–1.03)
SP02: 100
SP02: 96
TDI LATERAL: 0.06 M/S
TDI SEPTAL: 0.05 M/S
TDI: 0.06 M/S
TOXICOLOGY INFORMATION: ABNORMAL
TR MAX PG: 12 MMHG
TRICUSPID ANNULAR PLANE SYSTOLIC EXCURSION: 2.02 CM
TROPONIN I SERPL DL<=0.01 NG/ML-MCNC: 0.09 NG/ML (ref 0–0.03)
TROPONIN I SERPL DL<=0.01 NG/ML-MCNC: 32.53 NG/ML (ref 0–0.03)
TSH SERPL DL<=0.005 MIU/L-ACNC: 2.58 UIU/ML (ref 0.4–4)
URN SPEC COLLECT METH UR: ABNORMAL
UROBILINOGEN UR STRIP-ACNC: NEGATIVE EU/DL
VT: 420
VT: 500
VT: 500
WBC # BLD AUTO: 10.17 K/UL (ref 3.9–12.7)
WBC # BLD AUTO: 10.61 K/UL (ref 3.9–12.7)
WBC # BLD AUTO: 11.14 K/UL (ref 3.9–12.7)
WBC # BLD AUTO: 11.98 K/UL (ref 3.9–12.7)
WBC # BLD AUTO: 8.73 K/UL (ref 3.9–12.7)
WBC #/AREA URNS HPF: 59 /HPF (ref 0–5)
YEAST URNS QL MICRO: ABNORMAL

## 2022-01-01 PROCEDURE — 63600175 PHARM REV CODE 636 W HCPCS: Performed by: STUDENT IN AN ORGANIZED HEALTH CARE EDUCATION/TRAINING PROGRAM

## 2022-01-01 PROCEDURE — 25000003 PHARM REV CODE 250: Performed by: STUDENT IN AN ORGANIZED HEALTH CARE EDUCATION/TRAINING PROGRAM

## 2022-01-01 PROCEDURE — 99291 CRITICAL CARE FIRST HOUR: CPT | Mod: ,,, | Performed by: INTERNAL MEDICINE

## 2022-01-01 PROCEDURE — 99291 PR CRITICAL CARE, E/M 30-74 MINUTES: ICD-10-PCS | Mod: ,,, | Performed by: INTERNAL MEDICINE

## 2022-01-01 PROCEDURE — 12000002 HC ACUTE/MED SURGE SEMI-PRIVATE ROOM

## 2022-01-01 PROCEDURE — 99900026 HC AIRWAY MAINTENANCE (STAT)

## 2022-01-01 PROCEDURE — 85730 THROMBOPLASTIN TIME PARTIAL: CPT | Performed by: HOSPITALIST

## 2022-01-01 PROCEDURE — 80053 COMPREHEN METABOLIC PANEL: CPT | Performed by: HOSPITALIST

## 2022-01-01 PROCEDURE — 99291 PR CRITICAL CARE, E/M 30-74 MINUTES: ICD-10-PCS | Mod: 25,,, | Performed by: INTERNAL MEDICINE

## 2022-01-01 PROCEDURE — 84132 ASSAY OF SERUM POTASSIUM: CPT

## 2022-01-01 PROCEDURE — 82330 ASSAY OF CALCIUM: CPT

## 2022-01-01 PROCEDURE — U0002 COVID-19 LAB TEST NON-CDC: HCPCS | Performed by: STUDENT IN AN ORGANIZED HEALTH CARE EDUCATION/TRAINING PROGRAM

## 2022-01-01 PROCEDURE — 63600175 PHARM REV CODE 636 W HCPCS: Performed by: HOSPITALIST

## 2022-01-01 PROCEDURE — 31500 INSERT EMERGENCY AIRWAY: CPT | Performed by: ANESTHESIOLOGY

## 2022-01-01 PROCEDURE — 36415 COLL VENOUS BLD VENIPUNCTURE: CPT | Performed by: STUDENT IN AN ORGANIZED HEALTH CARE EDUCATION/TRAINING PROGRAM

## 2022-01-01 PROCEDURE — 93010 ELECTROCARDIOGRAM REPORT: CPT | Mod: 76,59,, | Performed by: INTERNAL MEDICINE

## 2022-01-01 PROCEDURE — 63600175 PHARM REV CODE 636 W HCPCS: Performed by: ANESTHESIOLOGY

## 2022-01-01 PROCEDURE — 25500020 PHARM REV CODE 255: Performed by: HOSPITALIST

## 2022-01-01 PROCEDURE — 80048 BASIC METABOLIC PNL TOTAL CA: CPT | Mod: XB | Performed by: INTERNAL MEDICINE

## 2022-01-01 PROCEDURE — 27000221 HC OXYGEN, UP TO 24 HOURS

## 2022-01-01 PROCEDURE — 83880 ASSAY OF NATRIURETIC PEPTIDE: CPT | Performed by: EMERGENCY MEDICINE

## 2022-01-01 PROCEDURE — 36600 WITHDRAWAL OF ARTERIAL BLOOD: CPT

## 2022-01-01 PROCEDURE — 94761 N-INVAS EAR/PLS OXIMETRY MLT: CPT

## 2022-01-01 PROCEDURE — 93005 ELECTROCARDIOGRAM TRACING: CPT

## 2022-01-01 PROCEDURE — 25000003 PHARM REV CODE 250: Performed by: HOSPITALIST

## 2022-01-01 PROCEDURE — 83605 ASSAY OF LACTIC ACID: CPT | Performed by: STUDENT IN AN ORGANIZED HEALTH CARE EDUCATION/TRAINING PROGRAM

## 2022-01-01 PROCEDURE — 83735 ASSAY OF MAGNESIUM: CPT | Performed by: STUDENT IN AN ORGANIZED HEALTH CARE EDUCATION/TRAINING PROGRAM

## 2022-01-01 PROCEDURE — 25000003 PHARM REV CODE 250: Performed by: NURSE PRACTITIONER

## 2022-01-01 PROCEDURE — 85730 THROMBOPLASTIN TIME PARTIAL: CPT | Performed by: STUDENT IN AN ORGANIZED HEALTH CARE EDUCATION/TRAINING PROGRAM

## 2022-01-01 PROCEDURE — 83735 ASSAY OF MAGNESIUM: CPT | Performed by: EMERGENCY MEDICINE

## 2022-01-01 PROCEDURE — A4216 STERILE WATER/SALINE, 10 ML: HCPCS | Performed by: HOSPITALIST

## 2022-01-01 PROCEDURE — 93010 EKG 12-LEAD: ICD-10-PCS | Mod: ,,, | Performed by: INTERNAL MEDICINE

## 2022-01-01 PROCEDURE — 99900035 HC TECH TIME PER 15 MIN (STAT)

## 2022-01-01 PROCEDURE — 94002 VENT MGMT INPAT INIT DAY: CPT

## 2022-01-01 PROCEDURE — 63600175 PHARM REV CODE 636 W HCPCS: Performed by: EMERGENCY MEDICINE

## 2022-01-01 PROCEDURE — 80307 DRUG TEST PRSMV CHEM ANLYZR: CPT | Performed by: EMERGENCY MEDICINE

## 2022-01-01 PROCEDURE — 81000 URINALYSIS NONAUTO W/SCOPE: CPT | Mod: 59 | Performed by: EMERGENCY MEDICINE

## 2022-01-01 PROCEDURE — 82803 BLOOD GASES ANY COMBINATION: CPT

## 2022-01-01 PROCEDURE — 99499 UNLISTED E&M SERVICE: CPT | Mod: ,,, | Performed by: INTERNAL MEDICINE

## 2022-01-01 PROCEDURE — 85025 COMPLETE CBC W/AUTO DIFF WBC: CPT | Performed by: STUDENT IN AN ORGANIZED HEALTH CARE EDUCATION/TRAINING PROGRAM

## 2022-01-01 PROCEDURE — 84484 ASSAY OF TROPONIN QUANT: CPT | Performed by: EMERGENCY MEDICINE

## 2022-01-01 PROCEDURE — 85025 COMPLETE CBC W/AUTO DIFF WBC: CPT | Performed by: HOSPITALIST

## 2022-01-01 PROCEDURE — 63600175 PHARM REV CODE 636 W HCPCS

## 2022-01-01 PROCEDURE — 85014 HEMATOCRIT: CPT

## 2022-01-01 PROCEDURE — 36569 INSJ PICC 5 YR+ W/O IMAGING: CPT

## 2022-01-01 PROCEDURE — 85730 THROMBOPLASTIN TIME PARTIAL: CPT | Mod: 91 | Performed by: STUDENT IN AN ORGANIZED HEALTH CARE EDUCATION/TRAINING PROGRAM

## 2022-01-01 PROCEDURE — 84443 ASSAY THYROID STIM HORMONE: CPT | Performed by: EMERGENCY MEDICINE

## 2022-01-01 PROCEDURE — 21400001 HC TELEMETRY ROOM

## 2022-01-01 PROCEDURE — 94660 CPAP INITIATION&MGMT: CPT | Mod: XB

## 2022-01-01 PROCEDURE — 94003 VENT MGMT INPAT SUBQ DAY: CPT

## 2022-01-01 PROCEDURE — 99232 PR SUBSEQUENT HOSPITAL CARE,LEVL II: ICD-10-PCS | Mod: ,,, | Performed by: INTERNAL MEDICINE

## 2022-01-01 PROCEDURE — 93010 EKG 12-LEAD: ICD-10-PCS | Mod: 76,59,, | Performed by: INTERNAL MEDICINE

## 2022-01-01 PROCEDURE — 80053 COMPREHEN METABOLIC PANEL: CPT | Performed by: EMERGENCY MEDICINE

## 2022-01-01 PROCEDURE — 82077 ASSAY SPEC XCP UR&BREATH IA: CPT | Performed by: EMERGENCY MEDICINE

## 2022-01-01 PROCEDURE — 99283 EMERGENCY DEPT VISIT LOW MDM: CPT

## 2022-01-01 PROCEDURE — 25000003 PHARM REV CODE 250: Performed by: INTERNAL MEDICINE

## 2022-01-01 PROCEDURE — 96372 THER/PROPH/DIAG INJ SC/IM: CPT

## 2022-01-01 PROCEDURE — 63600175 PHARM REV CODE 636 W HCPCS: Performed by: PHYSICIAN ASSISTANT

## 2022-01-01 PROCEDURE — 84484 ASSAY OF TROPONIN QUANT: CPT | Performed by: STUDENT IN AN ORGANIZED HEALTH CARE EDUCATION/TRAINING PROGRAM

## 2022-01-01 PROCEDURE — 85025 COMPLETE CBC W/AUTO DIFF WBC: CPT | Performed by: EMERGENCY MEDICINE

## 2022-01-01 PROCEDURE — 83735 ASSAY OF MAGNESIUM: CPT | Performed by: HOSPITALIST

## 2022-01-01 PROCEDURE — 99291 CRITICAL CARE FIRST HOUR: CPT | Mod: 25

## 2022-01-01 PROCEDURE — 80048 BASIC METABOLIC PNL TOTAL CA: CPT | Performed by: STUDENT IN AN ORGANIZED HEALTH CARE EDUCATION/TRAINING PROGRAM

## 2022-01-01 PROCEDURE — 31500 AD HOC INTUBATION: ICD-10-PCS | Mod: ,,, | Performed by: ANESTHESIOLOGY

## 2022-01-01 PROCEDURE — 99284 EMERGENCY DEPT VISIT MOD MDM: CPT | Mod: 25

## 2022-01-01 PROCEDURE — 99232 SBSQ HOSP IP/OBS MODERATE 35: CPT | Mod: ,,, | Performed by: INTERNAL MEDICINE

## 2022-01-01 PROCEDURE — 84100 ASSAY OF PHOSPHORUS: CPT | Performed by: STUDENT IN AN ORGANIZED HEALTH CARE EDUCATION/TRAINING PROGRAM

## 2022-01-01 PROCEDURE — 84100 ASSAY OF PHOSPHORUS: CPT | Performed by: EMERGENCY MEDICINE

## 2022-01-01 PROCEDURE — 84295 ASSAY OF SERUM SODIUM: CPT

## 2022-01-01 PROCEDURE — 82565 ASSAY OF CREATININE: CPT

## 2022-01-01 PROCEDURE — 83605 ASSAY OF LACTIC ACID: CPT | Performed by: EMERGENCY MEDICINE

## 2022-01-01 PROCEDURE — 87086 URINE CULTURE/COLONY COUNT: CPT | Performed by: EMERGENCY MEDICINE

## 2022-01-01 PROCEDURE — 84100 ASSAY OF PHOSPHORUS: CPT | Performed by: HOSPITALIST

## 2022-01-01 PROCEDURE — 85730 THROMBOPLASTIN TIME PARTIAL: CPT | Mod: 91 | Performed by: HOSPITALIST

## 2022-01-01 PROCEDURE — 93010 ELECTROCARDIOGRAM REPORT: CPT | Mod: ,,, | Performed by: INTERNAL MEDICINE

## 2022-01-01 PROCEDURE — 99291 CRITICAL CARE FIRST HOUR: CPT | Mod: 25,,, | Performed by: INTERNAL MEDICINE

## 2022-01-01 PROCEDURE — 99292 CRITICAL CARE ADDL 30 MIN: CPT

## 2022-01-01 PROCEDURE — 80048 BASIC METABOLIC PNL TOTAL CA: CPT | Mod: 91 | Performed by: INTERNAL MEDICINE

## 2022-01-01 PROCEDURE — 85610 PROTHROMBIN TIME: CPT | Performed by: STUDENT IN AN ORGANIZED HEALTH CARE EDUCATION/TRAINING PROGRAM

## 2022-01-01 PROCEDURE — 99499 NO LOS: ICD-10-PCS | Mod: ,,, | Performed by: INTERNAL MEDICINE

## 2022-01-01 PROCEDURE — 96365 THER/PROPH/DIAG IV INF INIT: CPT

## 2022-01-01 PROCEDURE — C1751 CATH, INF, PER/CENT/MIDLINE: HCPCS

## 2022-01-01 PROCEDURE — 20000000 HC ICU ROOM

## 2022-01-01 PROCEDURE — 96375 TX/PRO/DX INJ NEW DRUG ADDON: CPT

## 2022-01-01 RX ORDER — CALCIUM CHLORIDE INJECTION 100 MG/ML
INJECTION, SOLUTION INTRAVENOUS CODE/TRAUMA/SEDATION MEDICATION
Status: COMPLETED | OUTPATIENT
Start: 2022-01-01 | End: 2022-01-01

## 2022-01-01 RX ORDER — EPINEPHRINE 0.1 MG/ML
INJECTION INTRAVENOUS CODE/TRAUMA/SEDATION MEDICATION
Status: COMPLETED | OUTPATIENT
Start: 2022-01-01 | End: 2022-01-01

## 2022-01-01 RX ORDER — POTASSIUM CHLORIDE 7.45 MG/ML
80 INJECTION INTRAVENOUS
Status: DISCONTINUED | OUTPATIENT
Start: 2022-01-01 | End: 2022-01-01 | Stop reason: HOSPADM

## 2022-01-01 RX ORDER — MIDAZOLAM HYDROCHLORIDE 1 MG/ML
4 INJECTION INTRAMUSCULAR; INTRAVENOUS
Status: COMPLETED | OUTPATIENT
Start: 2022-01-01 | End: 2022-01-01

## 2022-01-01 RX ORDER — PROCHLORPERAZINE EDISYLATE 5 MG/ML
5 INJECTION INTRAMUSCULAR; INTRAVENOUS EVERY 6 HOURS PRN
Status: DISCONTINUED | OUTPATIENT
Start: 2022-01-01 | End: 2022-01-01 | Stop reason: HOSPADM

## 2022-01-01 RX ORDER — ATENOLOL 50 MG/1
50 TABLET ORAL
Status: COMPLETED | OUTPATIENT
Start: 2022-01-01 | End: 2022-01-01

## 2022-01-01 RX ORDER — CALCIUM GLUCONATE 20 MG/ML
1 INJECTION, SOLUTION INTRAVENOUS
Status: DISCONTINUED | OUTPATIENT
Start: 2022-01-01 | End: 2022-01-01 | Stop reason: HOSPADM

## 2022-01-01 RX ORDER — SODIUM CHLORIDE 0.9 % (FLUSH) 0.9 %
10 SYRINGE (ML) INJECTION
Status: DISCONTINUED | OUTPATIENT
Start: 2022-01-01 | End: 2022-01-01

## 2022-01-01 RX ORDER — AMIODARONE HYDROCHLORIDE 200 MG/1
400 TABLET ORAL DAILY
Status: DISCONTINUED | OUTPATIENT
Start: 2022-01-01 | End: 2022-01-01 | Stop reason: HOSPADM

## 2022-01-01 RX ORDER — CLOPIDOGREL BISULFATE 75 MG/1
75 TABLET ORAL DAILY
Status: DISCONTINUED | OUTPATIENT
Start: 2022-01-01 | End: 2022-01-01 | Stop reason: HOSPADM

## 2022-01-01 RX ORDER — POTASSIUM CHLORIDE 7.45 MG/ML
10 INJECTION INTRAVENOUS
Status: DISCONTINUED | OUTPATIENT
Start: 2022-01-01 | End: 2022-01-01

## 2022-01-01 RX ORDER — LORAZEPAM 2 MG/ML
INJECTION INTRAMUSCULAR
Status: DISCONTINUED
Start: 2022-01-01 | End: 2022-01-01 | Stop reason: WASHOUT

## 2022-01-01 RX ORDER — FENTANYL CITRATE 50 UG/ML
50 INJECTION, SOLUTION INTRAMUSCULAR; INTRAVENOUS
Status: DISCONTINUED | OUTPATIENT
Start: 2022-01-01 | End: 2022-01-01

## 2022-01-01 RX ORDER — POTASSIUM CHLORIDE 7.45 MG/ML
40 INJECTION INTRAVENOUS
Status: DISCONTINUED | OUTPATIENT
Start: 2022-01-01 | End: 2022-01-01 | Stop reason: HOSPADM

## 2022-01-01 RX ORDER — PROPOFOL 10 MG/ML
INJECTION, EMULSION INTRAVENOUS
Status: COMPLETED
Start: 2022-01-01 | End: 2022-01-01

## 2022-01-01 RX ORDER — IBUPROFEN 800 MG/1
800 TABLET ORAL EVERY 6 HOURS PRN
Qty: 20 TABLET | Refills: 0 | Status: SHIPPED | OUTPATIENT
Start: 2022-01-01

## 2022-01-01 RX ORDER — CALCIUM GLUCONATE 20 MG/ML
2 INJECTION, SOLUTION INTRAVENOUS
Status: DISCONTINUED | OUTPATIENT
Start: 2022-01-01 | End: 2022-01-01 | Stop reason: HOSPADM

## 2022-01-01 RX ORDER — POTASSIUM CHLORIDE 7.45 MG/ML
60 INJECTION INTRAVENOUS
Status: DISCONTINUED | OUTPATIENT
Start: 2022-01-01 | End: 2022-01-01 | Stop reason: HOSPADM

## 2022-01-01 RX ORDER — AMLODIPINE BESYLATE 10 MG/1
10 TABLET ORAL DAILY
Qty: 30 TABLET | Refills: 6 | Status: SHIPPED | OUTPATIENT
Start: 2022-01-01

## 2022-01-01 RX ORDER — ATENOLOL 25 MG/1
50 TABLET ORAL DAILY
Qty: 14 TABLET | Refills: 0 | Status: SHIPPED | OUTPATIENT
Start: 2022-01-01 | End: 2022-01-01

## 2022-01-01 RX ORDER — ONDANSETRON 2 MG/ML
4 INJECTION INTRAMUSCULAR; INTRAVENOUS EVERY 8 HOURS PRN
Status: DISCONTINUED | OUTPATIENT
Start: 2022-01-01 | End: 2022-01-01 | Stop reason: HOSPADM

## 2022-01-01 RX ORDER — POTASSIUM CHLORIDE 29.8 MG/ML
40 INJECTION INTRAVENOUS ONCE
Status: COMPLETED | OUTPATIENT
Start: 2022-01-01 | End: 2022-01-01

## 2022-01-01 RX ORDER — NAPROXEN SODIUM 220 MG/1
81 TABLET, FILM COATED ORAL DAILY
Status: DISCONTINUED | OUTPATIENT
Start: 2022-01-01 | End: 2022-01-01 | Stop reason: HOSPADM

## 2022-01-01 RX ORDER — AMIODARONE HYDROCHLORIDE 150 MG/3ML
INJECTION, SOLUTION INTRAVENOUS CODE/TRAUMA/SEDATION MEDICATION
Status: COMPLETED | OUTPATIENT
Start: 2022-01-01 | End: 2022-01-01

## 2022-01-01 RX ORDER — PROPOFOL 10 MG/ML
0-50 INJECTION, EMULSION INTRAVENOUS CONTINUOUS
Status: DISCONTINUED | OUTPATIENT
Start: 2022-01-01 | End: 2022-01-01

## 2022-01-01 RX ORDER — NOREPINEPHRINE BITARTRATE/D5W 4MG/250ML
0-3 PLASTIC BAG, INJECTION (ML) INTRAVENOUS CONTINUOUS
Status: DISCONTINUED | OUTPATIENT
Start: 2022-01-01 | End: 2022-01-01

## 2022-01-01 RX ORDER — MAGNESIUM SULFATE HEPTAHYDRATE 40 MG/ML
4 INJECTION, SOLUTION INTRAVENOUS
Status: DISCONTINUED | OUTPATIENT
Start: 2022-01-01 | End: 2022-01-01 | Stop reason: HOSPADM

## 2022-01-01 RX ORDER — TALC
6 POWDER (GRAM) TOPICAL NIGHTLY PRN
Status: DISCONTINUED | OUTPATIENT
Start: 2022-01-01 | End: 2022-01-01 | Stop reason: HOSPADM

## 2022-01-01 RX ORDER — MIDAZOLAM HYDROCHLORIDE 1 MG/ML
INJECTION INTRAMUSCULAR; INTRAVENOUS
Status: COMPLETED
Start: 2022-01-01 | End: 2022-01-01

## 2022-01-01 RX ORDER — KETOROLAC TROMETHAMINE 30 MG/ML
30 INJECTION, SOLUTION INTRAMUSCULAR; INTRAVENOUS
Status: COMPLETED | OUTPATIENT
Start: 2022-01-01 | End: 2022-01-01

## 2022-01-01 RX ORDER — HYDROCODONE BITARTRATE AND ACETAMINOPHEN 10; 325 MG/1; MG/1
1 TABLET ORAL EVERY 4 HOURS PRN
Status: DISCONTINUED | OUTPATIENT
Start: 2022-01-01 | End: 2022-01-01

## 2022-01-01 RX ORDER — ATENOLOL 25 MG/1
50 TABLET ORAL DAILY
Qty: 14 TABLET | Refills: 0 | Status: SHIPPED | OUTPATIENT
Start: 2022-01-01 | End: 2022-01-01 | Stop reason: SDUPTHER

## 2022-01-01 RX ORDER — EPINEPHRINE 0.1 MG/ML
INJECTION INTRAVENOUS CODE/TRAUMA/SEDATION MEDICATION
Status: SHIPPED | OUTPATIENT
Start: 2022-01-01

## 2022-01-01 RX ORDER — SODIUM CHLORIDE 0.9 % (FLUSH) 0.9 %
10 SYRINGE (ML) INJECTION EVERY 6 HOURS
Status: DISCONTINUED | OUTPATIENT
Start: 2022-01-01 | End: 2022-01-01 | Stop reason: HOSPADM

## 2022-01-01 RX ORDER — MAGNESIUM SULFATE HEPTAHYDRATE 40 MG/ML
2 INJECTION, SOLUTION INTRAVENOUS
Status: DISCONTINUED | OUTPATIENT
Start: 2022-01-01 | End: 2022-01-01 | Stop reason: HOSPADM

## 2022-01-01 RX ORDER — HEPARIN SODIUM,PORCINE/D5W 25000/250
0-40 INTRAVENOUS SOLUTION INTRAVENOUS CONTINUOUS
Status: DISCONTINUED | OUTPATIENT
Start: 2022-01-01 | End: 2022-01-01

## 2022-01-01 RX ORDER — SODIUM BICARBONATE 1 MEQ/ML
SYRINGE (ML) INTRAVENOUS CODE/TRAUMA/SEDATION MEDICATION
Status: COMPLETED | OUTPATIENT
Start: 2022-01-01 | End: 2022-01-01

## 2022-01-01 RX ORDER — POTASSIUM CHLORIDE 20 MEQ/1
40 TABLET, EXTENDED RELEASE ORAL ONCE
Status: COMPLETED | OUTPATIENT
Start: 2022-01-01 | End: 2022-01-01

## 2022-01-01 RX ORDER — ACETAMINOPHEN 325 MG/1
650 TABLET ORAL EVERY 4 HOURS PRN
Status: DISCONTINUED | OUTPATIENT
Start: 2022-01-01 | End: 2022-01-01 | Stop reason: HOSPADM

## 2022-01-01 RX ORDER — ACETAMINOPHEN 325 MG/1
650 TABLET ORAL EVERY 4 HOURS PRN
Status: DISCONTINUED | OUTPATIENT
Start: 2022-01-01 | End: 2022-01-01

## 2022-01-01 RX ORDER — SODIUM BICARBONATE 1 MEQ/ML
SYRINGE (ML) INTRAVENOUS CODE/TRAUMA/SEDATION MEDICATION
Status: SHIPPED | OUTPATIENT
Start: 2022-01-01

## 2022-01-01 RX ORDER — HYDROCODONE BITARTRATE AND ACETAMINOPHEN 5; 325 MG/1; MG/1
1 TABLET ORAL EVERY 4 HOURS PRN
Qty: 12 TABLET | Refills: 0 | Status: SHIPPED | OUTPATIENT
Start: 2022-01-01

## 2022-01-01 RX ORDER — CLOPIDOGREL 300 MG/1
600 TABLET, FILM COATED ORAL
Status: COMPLETED | OUTPATIENT
Start: 2022-01-01 | End: 2022-01-01

## 2022-01-01 RX ORDER — MUPIROCIN 20 MG/G
OINTMENT TOPICAL 2 TIMES DAILY
Status: DISCONTINUED | OUTPATIENT
Start: 2022-01-01 | End: 2022-01-01 | Stop reason: HOSPADM

## 2022-01-01 RX ORDER — FENTANYL CITRATE-0.9 % NACL/PF 10 MCG/ML
0-250 PLASTIC BAG, INJECTION (ML) INTRAVENOUS CONTINUOUS
Status: DISCONTINUED | OUTPATIENT
Start: 2022-01-01 | End: 2022-01-01

## 2022-01-01 RX ORDER — FAMOTIDINE 10 MG/ML
20 INJECTION INTRAVENOUS DAILY
Status: DISCONTINUED | OUTPATIENT
Start: 2022-01-01 | End: 2022-01-01 | Stop reason: HOSPADM

## 2022-01-01 RX ORDER — ATORVASTATIN CALCIUM 40 MG/1
40 TABLET, FILM COATED ORAL NIGHTLY
Status: DISCONTINUED | OUTPATIENT
Start: 2022-01-01 | End: 2022-01-01 | Stop reason: HOSPADM

## 2022-01-01 RX ORDER — ASPIRIN 300 MG/1
300 SUPPOSITORY RECTAL
Status: COMPLETED | OUTPATIENT
Start: 2022-01-01 | End: 2022-01-01

## 2022-01-01 RX ORDER — HEPARIN SODIUM 5000 [USP'U]/ML
4000 INJECTION, SOLUTION INTRAVENOUS; SUBCUTANEOUS
Status: COMPLETED | OUTPATIENT
Start: 2022-01-01 | End: 2022-01-01

## 2022-01-01 RX ORDER — HYDROCODONE BITARTRATE AND ACETAMINOPHEN 5; 325 MG/1; MG/1
1 TABLET ORAL EVERY 4 HOURS PRN
Status: DISCONTINUED | OUTPATIENT
Start: 2022-01-01 | End: 2022-01-01

## 2022-01-01 RX ORDER — IPRATROPIUM BROMIDE AND ALBUTEROL SULFATE 2.5; .5 MG/3ML; MG/3ML
3 SOLUTION RESPIRATORY (INHALATION) EVERY 4 HOURS PRN
Status: DISCONTINUED | OUTPATIENT
Start: 2022-01-01 | End: 2022-01-01 | Stop reason: HOSPADM

## 2022-01-01 RX ORDER — FENTANYL CITRATE 50 UG/ML
50 INJECTION, SOLUTION INTRAMUSCULAR; INTRAVENOUS
Status: ACTIVE | OUTPATIENT
Start: 2022-01-01 | End: 2022-01-01

## 2022-01-01 RX ORDER — SODIUM CHLORIDE 0.9 % (FLUSH) 0.9 %
10 SYRINGE (ML) INJECTION
Status: DISCONTINUED | OUTPATIENT
Start: 2022-01-01 | End: 2022-01-01 | Stop reason: HOSPADM

## 2022-01-01 RX ORDER — MAGNESIUM SULFATE HEPTAHYDRATE 500 MG/ML
INJECTION, SOLUTION INTRAMUSCULAR; INTRAVENOUS CODE/TRAUMA/SEDATION MEDICATION
Status: COMPLETED | OUTPATIENT
Start: 2022-01-01 | End: 2022-01-01

## 2022-01-01 RX ORDER — ATROPINE SULFATE 0.1 MG/ML
INJECTION INTRAVENOUS CODE/TRAUMA/SEDATION MEDICATION
Status: SHIPPED | OUTPATIENT
Start: 2022-01-01

## 2022-01-01 RX ORDER — ATENOLOL 25 MG/1
25 TABLET ORAL DAILY
Qty: 30 TABLET | Refills: 6 | OUTPATIENT
Start: 2022-01-01 | End: 2022-01-01

## 2022-01-01 RX ORDER — SUCCINYLCHOLINE CHLORIDE 20 MG/ML
INJECTION INTRAMUSCULAR; INTRAVENOUS
Status: DISCONTINUED | OUTPATIENT
Start: 2022-01-01 | End: 2022-12-06 | Stop reason: HOSPADM

## 2022-01-01 RX ORDER — CALCIUM GLUCONATE 20 MG/ML
3 INJECTION, SOLUTION INTRAVENOUS
Status: DISCONTINUED | OUTPATIENT
Start: 2022-01-01 | End: 2022-01-01 | Stop reason: HOSPADM

## 2022-01-01 RX ADMIN — ASPIRIN 81 MG CHEWABLE TABLET 81 MG: 81 TABLET CHEWABLE at 08:12

## 2022-01-01 RX ADMIN — EPINEPHRINE 0.1 MG: 0.1 INJECTION, SOLUTION ENDOTRACHEAL; INTRACARDIAC; INTRAVENOUS at 08:12

## 2022-01-01 RX ADMIN — Medication 10 ML: at 06:12

## 2022-01-01 RX ADMIN — ASPIRIN 81 MG CHEWABLE TABLET 81 MG: 81 TABLET CHEWABLE at 09:12

## 2022-01-01 RX ADMIN — MIDAZOLAM 4 MG: 1 INJECTION INTRAMUSCULAR; INTRAVENOUS at 10:12

## 2022-01-01 RX ADMIN — AMIODARONE HYDROCHLORIDE 1 MG/MIN: 1.8 INJECTION, SOLUTION INTRAVENOUS at 10:12

## 2022-01-01 RX ADMIN — HEPARIN SODIUM 12 UNITS/KG/HR: 10000 INJECTION, SOLUTION INTRAVENOUS at 12:12

## 2022-01-01 RX ADMIN — Medication 10 ML: at 02:12

## 2022-01-01 RX ADMIN — POTASSIUM BICARBONATE 40 MEQ: 391 TABLET, EFFERVESCENT ORAL at 11:12

## 2022-01-01 RX ADMIN — AMIODARONE HYDROCHLORIDE 0.5 MG/MIN: 1.8 INJECTION, SOLUTION INTRAVENOUS at 03:12

## 2022-01-01 RX ADMIN — MAGNESIUM SULFATE HEPTAHYDRATE 2 G: 500 INJECTION, SOLUTION INTRAMUSCULAR; INTRAVENOUS at 10:12

## 2022-01-01 RX ADMIN — AMIODARONE HYDROCHLORIDE 400 MG: 200 TABLET ORAL at 11:12

## 2022-01-01 RX ADMIN — MIDAZOLAM 4 MG: 1 INJECTION INTRAMUSCULAR; INTRAVENOUS at 12:12

## 2022-01-01 RX ADMIN — ATENOLOL 50 MG: 50 TABLET ORAL at 06:03

## 2022-01-01 RX ADMIN — ATROPINE SULFATE 1 MG: 0.1 INJECTION, SOLUTION INTRAVENOUS at 08:12

## 2022-01-01 RX ADMIN — FAMOTIDINE 20 MG: 10 INJECTION INTRAVENOUS at 08:12

## 2022-01-01 RX ADMIN — SUCCINYLCHOLINE CHLORIDE 100 MG: 20 INJECTION, SOLUTION INTRAMUSCULAR; INTRAVENOUS at 08:12

## 2022-01-01 RX ADMIN — CLOPIDOGREL BISULFATE 75 MG: 75 TABLET ORAL at 09:12

## 2022-01-01 RX ADMIN — CLOPIDOGREL BISULFATE 75 MG: 75 TABLET ORAL at 08:12

## 2022-01-01 RX ADMIN — SODIUM BICARBONATE 50 MEQ: 84 INJECTION, SOLUTION INTRAVENOUS at 07:12

## 2022-01-01 RX ADMIN — Medication 10 ML: at 12:12

## 2022-01-01 RX ADMIN — EPINEPHRINE 1 MG: 0.1 INJECTION, SOLUTION ENDOTRACHEAL; INTRACARDIAC; INTRAVENOUS at 10:12

## 2022-01-01 RX ADMIN — ATORVASTATIN CALCIUM 40 MG: 40 TABLET, FILM COATED ORAL at 08:12

## 2022-01-01 RX ADMIN — KETOROLAC TROMETHAMINE 30 MG: 30 INJECTION, SOLUTION INTRAMUSCULAR at 01:02

## 2022-01-01 RX ADMIN — AMIODARONE HYDROCHLORIDE 300 MG: 50 INJECTION, SOLUTION INTRAVENOUS at 10:12

## 2022-01-01 RX ADMIN — Medication 25 MCG/HR: at 11:12

## 2022-01-01 RX ADMIN — SODIUM BICARBONATE 50 MEQ: 84 INJECTION, SOLUTION INTRAVENOUS at 10:12

## 2022-01-01 RX ADMIN — EPINEPHRINE 0.1 MG: 0.1 INJECTION, SOLUTION ENDOTRACHEAL; INTRACARDIAC; INTRAVENOUS at 07:12

## 2022-01-01 RX ADMIN — SODIUM CHLORIDE, SODIUM LACTATE, POTASSIUM CHLORIDE, AND CALCIUM CHLORIDE 500 ML: .6; .31; .03; .02 INJECTION, SOLUTION INTRAVENOUS at 11:12

## 2022-01-01 RX ADMIN — ACETAMINOPHEN 650 MG: 325 TABLET ORAL at 06:12

## 2022-01-01 RX ADMIN — Medication 10 ML: at 05:12

## 2022-01-01 RX ADMIN — MIDAZOLAM HYDROCHLORIDE 4 MG: 1 INJECTION INTRAMUSCULAR; INTRAVENOUS at 11:12

## 2022-01-01 RX ADMIN — PROPOFOL 5 MCG/KG/MIN: 10 INJECTION, EMULSION INTRAVENOUS at 02:12

## 2022-01-01 RX ADMIN — MUPIROCIN: 20 OINTMENT TOPICAL at 08:12

## 2022-01-01 RX ADMIN — SODIUM PHOSPHATE, MONOBASIC, MONOHYDRATE 15 MMOL: 276; 142 INJECTION, SOLUTION INTRAVENOUS at 06:12

## 2022-01-01 RX ADMIN — Medication 10 ML: at 07:12

## 2022-01-01 RX ADMIN — FAMOTIDINE 20 MG: 10 INJECTION INTRAVENOUS at 09:12

## 2022-01-01 RX ADMIN — ASPIRIN 300 MG: 300 SUPPOSITORY RECTAL at 11:12

## 2022-01-01 RX ADMIN — HEPARIN SODIUM 20 UNITS/KG/HR: 10000 INJECTION, SOLUTION INTRAVENOUS at 05:12

## 2022-01-01 RX ADMIN — PROPOFOL 5 MCG/KG/MIN: 10 INJECTION, EMULSION INTRAVENOUS at 04:12

## 2022-01-01 RX ADMIN — AMIODARONE HYDROCHLORIDE 400 MG: 200 TABLET ORAL at 09:12

## 2022-01-01 RX ADMIN — SODIUM BICARBONATE 50 MEQ: 84 INJECTION, SOLUTION INTRAVENOUS at 08:12

## 2022-01-01 RX ADMIN — MIDAZOLAM 4 MG: 1 INJECTION INTRAMUSCULAR; INTRAVENOUS at 11:12

## 2022-01-01 RX ADMIN — POTASSIUM CHLORIDE 40 MEQ: 1500 TABLET, EXTENDED RELEASE ORAL at 08:12

## 2022-01-01 RX ADMIN — ACETAMINOPHEN 650 MG: 325 TABLET ORAL at 04:12

## 2022-01-01 RX ADMIN — Medication 0.02 MCG/KG/MIN: at 05:12

## 2022-01-01 RX ADMIN — CALCIUM CHLORIDE 1 G: 100 INJECTION, SOLUTION INTRAVENOUS; INTRAVENTRICULAR at 10:12

## 2022-01-01 RX ADMIN — ATROPINE SULFATE 1 MG: 0.1 INJECTION, SOLUTION INTRAVENOUS at 07:12

## 2022-01-01 RX ADMIN — HEPARIN SODIUM 14 UNITS/KG/HR: 10000 INJECTION, SOLUTION INTRAVENOUS at 07:12

## 2022-01-01 RX ADMIN — Medication 200 MCG/HR: at 04:12

## 2022-01-01 RX ADMIN — MIDAZOLAM 0.5 MG/HR: 5 INJECTION INTRAMUSCULAR; INTRAVENOUS at 01:12

## 2022-01-01 RX ADMIN — POTASSIUM CHLORIDE 40 MEQ: 29.8 INJECTION, SOLUTION INTRAVENOUS at 04:12

## 2022-01-01 RX ADMIN — MIDAZOLAM HYDROCHLORIDE 4 MG: 1 INJECTION INTRAMUSCULAR; INTRAVENOUS at 10:12

## 2022-01-01 RX ADMIN — Medication 10 ML: at 11:12

## 2022-01-01 RX ADMIN — CLOPIDOGREL BISULFATE 600 MG: 300 TABLET, FILM COATED ORAL at 10:12

## 2022-01-01 RX ADMIN — HEPARIN SODIUM 4000 UNITS: 5000 INJECTION INTRAVENOUS; SUBCUTANEOUS at 10:12

## 2022-01-01 RX ADMIN — AMIODARONE HYDROCHLORIDE 0.5 MG/MIN: 1.8 INJECTION, SOLUTION INTRAVENOUS at 04:12

## 2022-01-01 RX ADMIN — IOHEXOL 75 ML: 350 INJECTION, SOLUTION INTRAVENOUS at 03:12

## 2022-01-01 RX ADMIN — MUPIROCIN: 20 OINTMENT TOPICAL at 10:12

## 2022-01-01 RX ADMIN — HEPARIN SODIUM 16 UNITS/KG/HR: 10000 INJECTION, SOLUTION INTRAVENOUS at 02:12

## 2022-01-01 RX ADMIN — AMIODARONE HYDROCHLORIDE 1 MG/MIN: 1.8 INJECTION, SOLUTION INTRAVENOUS at 03:12

## 2022-01-01 RX ADMIN — MUPIROCIN: 20 OINTMENT TOPICAL at 09:12

## 2022-02-24 NOTE — ED PROVIDER NOTES
Encounter Date: 2/23/2022       History     Chief Complaint   Patient presents with    Knee Pain     Patient reports left knee 1 month. Patient states that about 1 hour pta to ED, he was stepping back and heard a popping noise and now is having severe pain. Patient states that he is also out of his BP medication. He states that his last dose was yesterday.     Medication Refill     61-year-old male with a past medical history of arthritis and left knee presents complaining of left knee pain for 1 month.  Patient states he felt a pop and left knee today.  And and is having problems with bearing weight.  No treatment prior to arrival.        Review of patient's allergies indicates:  No Known Allergies  Past Medical History:   Diagnosis Date    Anxiety     HTN (hypertension)     Hypercholesteremia     Pulmonary embolism      History reviewed. No pertinent surgical history.  History reviewed. No pertinent family history.  Social History     Tobacco Use    Smoking status: Former Smoker    Smokeless tobacco: Never Used   Substance Use Topics    Alcohol use: No    Drug use: Never     Review of Systems   Constitutional: Negative for fever.   HENT: Negative for ear pain.    Cardiovascular: Negative for chest pain.   Gastrointestinal: Negative for abdominal pain.   Musculoskeletal: Arthralgias: Left knee pain.   Hematological: Negative for adenopathy.   Psychiatric/Behavioral: Negative for behavioral problems.       Physical Exam     Initial Vitals [02/23/22 1219]   BP Pulse Resp Temp SpO2   (!) 177/99 77 18 99.5 °F (37.5 °C) 97 %      MAP       --         Physical Exam    Constitutional: He appears well-developed and well-nourished.   Eyes: Conjunctivae and EOM are normal. Pupils are equal, round, and reactive to light.   Neck: Neck supple.   Normal range of motion.  Cardiovascular: Normal rate, regular rhythm, normal heart sounds and intact distal pulses.   Pulmonary/Chest: Breath sounds normal.   Abdominal:  Abdomen is soft.   Musculoskeletal:         General: Tenderness (Tenderness to lateral aspect of left knee.  Full range of motion of left knee.  No swelling noted.) present.      Cervical back: Normal range of motion and neck supple.     Neurological: He is alert and oriented to person, place, and time. He has normal strength. No cranial nerve deficit.   Skin: Skin is warm.         ED Course   Procedures  Labs Reviewed - No data to display       Imaging Results          X-Ray Knee 3 View Left (Final result)  Result time 02/23/22 14:27:19    Final result by Mirella Begum MD (02/23/22 14:27:19)                 Impression:      Slight depression of the articular surface of the medial femoral condyle, which indicates an age indeterminate injury, favored to be chronic.  Correlation with tenderness in this region.  Comparison with prior imaging, if available, would be helpful.  If there is ongoing clinical concern for a knee injury, cross-sectional imaging could be considered.      Electronically signed by: Mirella Begum  Date:    02/23/2022  Time:    14:27             Narrative:    EXAMINATION:  XR KNEE 3 VIEW LEFT    CLINICAL HISTORY:  Pain in left knee    TECHNIQUE:  AP, lateral, and Merchant views of the left knee were performed.    COMPARISON:  None    FINDINGS:  There is a slight depression of the articular surface of the medial femoral condyle.  No definite acute fracture or dislocation.  Degenerative changes are present.  A small joint effusion is present.                                 Medications   ketorolac injection 30 mg (30 mg Intramuscular Given 2/23/22 1954)     Medical Decision Making:   Initial Assessment:   No acute fracture seen on x-ray.  Patient has a small joint effusion.  Patient given Toradol and steroid shot in the ED.  I will discharge patient home with ibuprofen and Norco.  Patient instructed to follow-up with orthopedic.  Patient is stable for discharge.                      Clinical  Impression:   Final diagnoses:  [M25.562] Knee pain, left  [M25.562] Acute pain of left knee (Primary)  [Z76.0] Medication refill          ED Disposition Condition    Discharge Stable        ED Prescriptions     Medication Sig Dispense Start Date End Date Auth. Provider    amLODIPine (NORVASC) 10 MG tablet Take 1 tablet (10 mg total) by mouth once daily. 30 tablet 2/23/2022  JG Ulloa    ibuprofen (ADVIL,MOTRIN) 800 MG tablet Take 1 tablet (800 mg total) by mouth every 6 (six) hours as needed for Pain. 20 tablet 2/23/2022  JG Ulloa    HYDROcodone-acetaminophen (NORCO) 5-325 mg per tablet Take 1 tablet by mouth every 4 (four) hours as needed. 12 tablet 2/23/2022  JG Ulloa    atenoloL (TENORMIN) 25 MG tablet Take 1 tablet (25 mg total) by mouth once daily. 30 tablet 2/23/2022  JG Ulloa        Follow-up Information     Follow up With Specialties Details Why Contact Info    Rod Cardoso MD Orthopedic Surgery Schedule an appointment as soon as possible for a visit   [address]  [address]  [place] [address]  [phone]             GJ Ulloa  02/23/22 2019

## 2022-03-11 PROBLEM — Z76.0 MEDICATION REFILL: Status: ACTIVE | Noted: 2022-01-01

## 2022-03-11 NOTE — ED TRIAGE NOTES
Pt. Is coming in for a refill for 50mg of Atenolol. Pt. States that he recently got a refill, but it was for 25mg of Atenolol and he was told that he could not her a refill. Pt. States that he was taking double the dose to equal 50mg. Pt. States that he ran out and could not take his medications on last night. Pt. Is concerned because his BP is elevated.

## 2022-03-12 NOTE — ED PROVIDER NOTES
Encounter Date: 3/11/2022       History     Chief Complaint   Patient presents with    Medication Refill     Pt to ER for medication adjustment. Pt usually takes Atenolol 50mg but was prescribed 25mg. Pt in no distress. With no medical complaints at this time      60 y/o male with HTN, anxiety and hx of PE which presents to the ED with needing a refill on his atenolol. Pt denies any symptoms.     The history is provided by the patient.     Review of patient's allergies indicates:  No Known Allergies  Past Medical History:   Diagnosis Date    Anxiety     HTN (hypertension)     Hypercholesteremia     Pulmonary embolism      History reviewed. No pertinent surgical history.  History reviewed. No pertinent family history.  Social History     Tobacco Use    Smoking status: Former Smoker    Smokeless tobacco: Never Used   Substance Use Topics    Alcohol use: No    Drug use: Never     Review of Systems   Constitutional: Negative for fever.   HENT: Negative for sore throat.    Respiratory: Negative for shortness of breath.    Cardiovascular: Negative for chest pain.   Gastrointestinal: Negative for nausea.   Genitourinary: Negative for dysuria.   Musculoskeletal: Negative for back pain.   Skin: Negative for rash.   Neurological: Negative for weakness.   Hematological: Does not bruise/bleed easily.   All other systems reviewed and are negative.    Physical Exam     Initial Vitals [03/11/22 1745]   BP Pulse Resp Temp SpO2   (!) 187/99 74 18 99.2 °F (37.3 °C) 97 %      MAP       --         Physical Exam    Nursing note and vitals reviewed.  Constitutional: He appears well-developed and well-nourished.   HENT:   Head: Normocephalic and atraumatic.   Eyes: Conjunctivae and EOM are normal. Pupils are equal, round, and reactive to light.   Neck:   Normal range of motion.  Cardiovascular: Normal rate, regular rhythm, normal heart sounds and intact distal pulses. Exam reveals no gallop and no friction rub.    No murmur  heard.  Pulmonary/Chest: Breath sounds normal. No respiratory distress. He has no wheezes. He has no rhonchi. He has no rales. He exhibits no tenderness.   Abdominal: Abdomen is soft. Bowel sounds are normal. He exhibits no distension and no mass. There is no abdominal tenderness. There is no rebound and no guarding.   Musculoskeletal:         General: No tenderness or edema. Normal range of motion.      Cervical back: Normal range of motion.     Neurological: He is alert and oriented to person, place, and time. He has normal strength. GCS score is 15. GCS eye subscore is 4. GCS verbal subscore is 5. GCS motor subscore is 6.   Skin: Skin is warm. Capillary refill takes less than 2 seconds.       ED Course   Procedures  Labs Reviewed - No data to display        Medications   atenoloL tablet 50 mg (50 mg Oral Given 3/11/22 1809)     Medical Decision Making:   Initial Assessment:   62 y/o male which presents for medication refill.  Differential Diagnosis:   HTN, encounter for medication refill, improper administration of home medication  ED Management:  Pt examined and has a normal exam. 7 day prescription given to the patient for his atenolol. He has been advised that the ED is NOT an appropriate place for medication refills unless it is an emergency. He should follow up with his PCP. He has been given information regarding [facility]. Patient given strict return precautions and voiced understanding of all discharge instructions. Pt was stable at discharge.                  ED Course as of 03/11/22 1819   Fri Mar 11, 2022   1757 BP(!): 187/99 [AT]   1757 Temp: 99.2 °F (37.3 °C) [AT]   1757 Temp src: Oral [AT]   1757 Pulse: 74 [AT]   1757 Resp: 18 [AT]   1757 SpO2: 97 % [AT]      ED Course User Index  [AT] JOAN Virgen             Clinical Impression:   Final diagnoses:  [Z76.0] Medication refill (Primary)          ED Disposition Condition    Discharge Stable        ED Prescriptions     Medication Sig  Dispense Start Date End Date Auth. Provider    atenoloL (TENORMIN) 25 MG tablet  (Status: Discontinued) Take 2 tablets (50 mg total) by mouth once daily. for 7 days 14 tablet 3/11/2022 3/11/2022 JOAN Virgen    atenoloL (TENORMIN) 25 MG tablet Take 2 tablets (50 mg total) by mouth once daily. for 7 days 14 tablet 3/11/2022 3/18/2022 JOAN Virgen        Follow-up Information     Follow up With Specialties Details Why Contact Info    [facility] [place]  Schedule an appointment as soon as possible for a visit in 3 days  [address]  [place] [address]  [phone]             JOAN Virgen  03/11/22 4443

## 2022-12-01 PROBLEM — I46.9 CARDIAC ARREST: Status: ACTIVE | Noted: 2022-01-01

## 2022-12-02 PROBLEM — R31.0 GROSS HEMATURIA: Status: ACTIVE | Noted: 2022-01-01

## 2022-12-02 PROBLEM — E87.6 HYPOKALEMIA: Status: ACTIVE | Noted: 2022-01-01

## 2022-12-02 PROBLEM — J96.01 ACUTE HYPOXEMIC RESPIRATORY FAILURE: Status: ACTIVE | Noted: 2022-01-01

## 2022-12-02 PROBLEM — E87.20 LACTIC ACIDOSIS: Status: ACTIVE | Noted: 2022-01-01

## 2022-12-02 PROBLEM — E83.39 HYPOPHOSPHATEMIA: Status: ACTIVE | Noted: 2022-01-01

## 2022-12-02 PROBLEM — N17.9 AKI (ACUTE KIDNEY INJURY): Status: ACTIVE | Noted: 2022-01-01

## 2022-12-02 NOTE — ASSESSMENT & PLAN NOTE
V. Fib arrest presenting with torsades initially.  Responding to ACLS with epi x 5, calcium chloride, amiodarone bolus, magnesium sulfate x 2, sodium bicarbonate x 1 and was defibrillated x 4 (2 x 200 and 2 x 400 joules).  Concern initially for STEMI, but repeat EKG showing no stemi.  Moving all extremities post-code, so no TTM.  The following morning, he was awake during an SAT and answering questions.  Heart rhythm stabilized with electrolyte replenishment and amiodarone load.  On heparin gtt.  - continue to keep K at 4, Mag at 2  - continue amiodarone gtt.  - continue heparin gtt  - cardiology on board and likely to cath in near future, when renal function improves.  - undergoing SBT at this time, and will likely extubate to BIPAP therapy.

## 2022-12-02 NOTE — CONSULTS
[facility] [place] Emergency Dept  Cardiology  Consult Note    Patient Name: Charles Kapoor  MRN: [id number]  Admission Date: 12/1/2022  Hospital Length of Stay: 0 days  Code Status: No Order   Attending Provider: Estefanía Dao*   Consulting Provider: Ba Alvarado MD  Primary Care Physician: [facility]  Principal Problem:Cardiac arrest    Patient information was obtained from ER records.     Consults requested by Dr. Dao for assistance with VF arrest, ?STEMI    Subjective:     Chief Complaint:  Cardiac arrest    HPI:   The patient is a 61-year-old man who presented to the emergency room with cardiac arrest.  He apparently walked into the waiting room, and was subsequently found to be in torsade.  He underwent ACLS with multiple rounds of epinephrine and CPR.  Initially was shocked back into a sinus rhythm right bundle-branch block and concern for lateral ST elevation.  After stabilization and initiation of amiodarone with intubation, the patient's ST segments have come down.  He does have some ST depression in the anterior leads.  There is no further ST elevation.  While initially comatose, he appears to now be making purposeful movements.  His blood pressure and his heart rate are stable.  Given the absence of ST elevation, and unclear but improved neurologic function, I plan to defer emergency catheterization.  The patient will be admitted to intensive care unit on the hospitalist service.  I would recommend continuation of aspirin, Plavix, intravenous heparin, as well as intravenous amiodarone.  I have discussed the case with Dr. Dao.      Past Medical History:   Diagnosis Date    Anxiety     HTN (hypertension)     Hypercholesteremia     Pulmonary embolism        No past surgical history on file.    Review of patient's allergies indicates:  No Known Allergies    No current facility-administered medications on file prior to encounter.     Current Outpatient  Medications on File Prior to Encounter   Medication Sig    amLODIPine (NORVASC) 10 MG tablet Take 1 tablet (10 mg total) by mouth once daily.    aspirin (ECOTRIN) 81 MG EC tablet Take 81 mg by mouth once daily.    atenoloL (TENORMIN) 25 MG tablet Take 2 tablets (50 mg total) by mouth once daily. for 7 days    HYDROcodone-acetaminophen (NORCO) 5-325 mg per tablet Take 1 tablet by mouth every 4 (four) hours as needed.    ibuprofen (ADVIL,MOTRIN) 800 MG tablet Take 1 tablet (800 mg total) by mouth every 6 (six) hours as needed for Pain.    pravastatin (PRAVACHOL) 20 MG tablet Take 1 tablet (20 mg total) by mouth once daily.     Family History    None       Tobacco Use    Smoking status: Former    Smokeless tobacco: Never   Substance and Sexual Activity    Alcohol use: No    Drug use: Never    Sexual activity: Yes     Review of Systems   Unable to perform ROS: Intubated   Objective:     Vital Signs (Most Recent):  Pulse: (!) 118 (12/01/22 2221)  Resp: (!) 34 (12/01/22 2221)  SpO2: 99 % (12/01/22 2221)   Vital Signs (24h Range):  Pulse:  [118-230] 118  Resp:  [20-34] 34  SpO2:  [94 %-99 %] 99 %     Weight: 120.2 kg (265 lb)  Body mass index is 41.5 kg/m².    SpO2: 99 %  O2 Device (Oxygen Therapy): ventilator    No intake or output data in the 24 hours ending 12/01/22 2315    Lines/Drains/Airways       Drain  Duration                  NG/OG Tube 12/01/22 2230 orogastric 18 Fr. Right mouth <1 day              Airway  Duration                  Airway - Non-Surgical 12/01/22 2203 Endotracheal Tube <1 day              Peripheral Intravenous Line  Duration                  Peripheral IV - Single Lumen 12/01/22 2201 20 G Left Antecubital <1 day         Peripheral IV - Single Lumen 12/01/22 2215 20 G Right Antecubital <1 day         Peripheral IV - Single Lumen 12/01/22 2220 20 G Right Wrist <1 day                    Physical Exam  Constitutional:       Appearance: He is obese. He is not ill-appearing.   HENT:       Head: Normocephalic and atraumatic.   Cardiovascular:      Rate and Rhythm: Normal rate and regular rhythm.      Pulses:           Radial pulses are 2+ on the right side.      Heart sounds: S1 normal and S2 normal. Heart sounds are distant. No murmur heard.    No friction rub. No gallop.   Pulmonary:      Effort: Pulmonary effort is normal.      Breath sounds: Normal breath sounds.      Comments: intubated  Abdominal:      General: There is no distension.      Palpations: Abdomen is soft.   Musculoskeletal:      Cervical back: Normal range of motion.      Right lower leg: No edema.      Left lower leg: No edema.   Skin:     General: Skin is warm and dry.   Neurological:      Comments: UTO   Psychiatric:      Comments: UTO       Current Medications:   aspirin  300 mg Rectal ED 1 Time    lactated ringers  500 mL Intravenous ED 1 Time    propofoL          amiodarone in dextrose 5% 1 mg/min (12/01/22 2222)    [START ON 12/2/2022] amiodarone in dextrose 5%      [START ON 12/2/2022] fentanyl      heparin (porcine) in D5W       [START ON 12/2/2022] heparin (PORCINE), [START ON 12/2/2022] heparin (PORCINE)    Laboratory (all labs reviewed):  CBC:  Recent Labs   Lab 02/17/21  1400 12/01/22  2213 12/01/22  2235   WBC 8.07  --  8.73   Hemoglobin 15.6  --  16.0   POC Hematocrit  --  47  --    Hematocrit 44.3  --  46.4   Platelets 209  --  166       CHEMISTRIES:  Recent Labs   Lab 02/17/21  1400   Glucose 100   Sodium 144   Potassium 2.6 LL   BUN 11   Creatinine 0.8   eGFR if non African American >60   Calcium 9.3       CARDIAC BIOMARKERS:  Recent Labs   Lab 02/17/21  1400 02/17/21  1728 12/01/22  2235   Troponin I 0.011 0.013 0.090 H       COAGS:        LIPIDS/LFTS:  Recent Labs   Lab 02/17/21  1400   AST 24   ALT 32       BNP:  Recent Labs   Lab 02/17/21  1400   BNP <10       TSH:        Free T4:        Diagnostic Results:  ECG (personally reviewed and interpreted tracing(s)):  12/1/22 2232 SR 99, RBBB, high lat BOBBY  with recip changes->ST segments much improved on repeat tracing at 1105pm.    Chest X-Ray (personally reviewed and interpreted image(s)): pending    Echo: ordered    Cath: pending neurologic recovery      Assessment and Plan:     * Cardiac arrest  VF arrest with ROSC after ACLS  CAHP score suggests good prognosis.  Initial concern for STEMI, ST segments have come down with medical stabilization  Admit pt to ICU on hospitalist svc  Cont IV amio/heparin  Cont ASA/Plavix/statin  Check echo  Cath pending clinical course and neurologic improvement        VTE Risk Mitigation (From admission, onward)         Ordered     heparin 25,000 units in dextrose 5% (100 units/ml) IV bolus from bag - ADDITIONAL PRN BOLUS - 60 units/kg (max bolus 4000 units)  As needed (PRN)        Question:  Heparin Infusion Adjustment (DO NOT MODIFY ANSWER)  Answer:  [email or web address]\epic\Images\Pharmacy\HeparinInfusions\heparin LOW INTENSITY nomogram for [facility] [id number].pdf    12/01/22 2302     heparin 25,000 units in dextrose 5% (100 units/ml) IV bolus from bag - ADDITIONAL PRN BOLUS - 30 units/kg (max bolus 4000 units)  As needed (PRN)        Question:  Heparin Infusion Adjustment (DO NOT MODIFY ANSWER)  Answer:  \\[email or web address]\epic\Images\Pharmacy\HeparinInfusions\heparin LOW INTENSITY nomogram for [facility] [id number].pdf    12/01/22 2302     heparin 25,000 units in dextrose 5% 250 mL (100 units/mL) infusion LOW INTENSITY nomogram - [facility]  Continuous        Question Answer Comment   Heparin Infusion Adjustment (DO NOT MODIFY ANSWER) \\[email or web address]\epic\Images\Pharmacy\HeparinInfusions\heparin LOW INTENSITY nomogram for [facility] [id number].pdf    Begin at (in units/kg/hr) 12        12/01/22 2302              Pt seen in ER trauma bay  Critical care time 35min.    Thank you for your consult. I will follow-up with patient. Please contact us if you have any additional questions.    Ba Alvarado MD  Cardiology   [facility] - Emergency Dept

## 2022-12-02 NOTE — CONSULTS
Inpatient consult to Cardiology  Consult performed by: Sherman Hussein MD  Consult ordered by: Cayetano Gray MD    See Dr. Alvarado note 12/01/2022

## 2022-12-02 NOTE — PLAN OF CARE
Initial assessment completed at bed side to discuss how patient will manage his care at the next level.  Roll of CM discussed.  Patient identified by using 2 identifiers:  Name and date of birth.  Patient intubated and sedated.  Assessment completed with cousinPhilomena ([phone]) at bedside.  CM will continue to follow and finalize plans  CM provided patient and family with contact info and encouraged Mr June to call for any discharge needs.    [facility]  Initial Discharge Assessment       Primary Care Provider: [facility]    Admission Diagnosis: Cardiac arrest [I46.9]  Seizure [R56.9]  Status post PICC central line placement [Z95.828]  Encounter for imaging study to confirm orogastric (OG) tube placement [Z01.89]  Endotracheally intubated [Z97.8]    Admission Date: 12/1/2022  Expected Discharge Date:     Discharge Barriers Identified: None    Payor: MEDICAID / Plan: [facility] ([place]ICA[place]) / Product Type: Managed Medicaid /     Extended Emergency Contact Information  Primary Emergency Contact: Denise Kapoor  Address: [address] [place] [address] [place] [address] [facility]  Home Phone: [phone]  Mobile Phone: [phone]  Relation: Spouse  Secondary Emergency Contact: philomena ascencio  Mobile Phone: [phone]  Relation: Brother  Preferred language: English   needed? No    Discharge Plan A:  (tbd)  Discharge Plan B: [id number] - [place] - [address]  [address]  [place] [address]  Phone: [phone] Fax: [phone]      Initial Assessment (most recent)       Adult Discharge Assessment - 12/02/22 1350          Discharge Assessment    Assessment Type Discharge Planning Assessment     Confirmed/corrected address, phone number and insurance Yes     Confirmed Demographics Correct on Facesheet     Source of Information family     If unable to  respond/provide information was family/caregiver contacted? Other (see comments)   Jose Gaming and Mrs Colon at bedside    When was your last doctors appointment? --   unknown    Communicated UMBERTO with patient/caregiver Date not available/Unable to determine     Reason For Admission Cardiac arrest     Lives With alone     Do you expect to return to your current living situation? --   tbd    Do you have help at home or someone to help you manage your care at home? Yes     Who are your caregiver(s) and their phone number(s)? Cousins go by and check on him     Prior to hospitilization cognitive status: Alert/Oriented     Current cognitive status: Coma/Sedated/Intubated     Walking or Climbing Stairs Difficulty none     Dressing/Bathing Difficulty none     Equipment Currently Used at Home none     Readmission within 30 days? No     Patient currently being followed by outpatient case management? No     Do you currently have service(s) that help you manage your care at home? No     Do you take prescription medications? Yes     Do you have prescription coverage? Yes     Coverage [facility] [employer]     Do you have any problems affording any of your prescribed medications? No     Is the patient taking medications as prescribed? yes     Who is going to help you get home at discharge? tbd     How do you get to doctors appointments? car, drives self     Are you on dialysis? No     Do you take coumadin? No     Discharge Plan A --   tbd    Discharge Plan B Home     DME Needed Upon Discharge  --   tbd    Discharge Plan discussed with: --   Jose Gaming    Discharge Barriers Identified None                                    Assessment finding will be shared with treatment team at MDT's

## 2022-12-02 NOTE — SUBJECTIVE & OBJECTIVE
Past Medical History:   Diagnosis Date    Anxiety     HTN (hypertension)     Hypercholesteremia     Pulmonary embolism        No past surgical history on file.    Review of patient's allergies indicates:  No Known Allergies    No current facility-administered medications on file prior to encounter.     Current Outpatient Medications on File Prior to Encounter   Medication Sig    amLODIPine (NORVASC) 10 MG tablet Take 1 tablet (10 mg total) by mouth once daily.    aspirin (ECOTRIN) 81 MG EC tablet Take 81 mg by mouth once daily.    atenoloL (TENORMIN) 25 MG tablet Take 2 tablets (50 mg total) by mouth once daily. for 7 days    HYDROcodone-acetaminophen (NORCO) 5-325 mg per tablet Take 1 tablet by mouth every 4 (four) hours as needed.    ibuprofen (ADVIL,MOTRIN) 800 MG tablet Take 1 tablet (800 mg total) by mouth every 6 (six) hours as needed for Pain.    pravastatin (PRAVACHOL) 20 MG tablet Take 1 tablet (20 mg total) by mouth once daily.     Family History    None       Tobacco Use    Smoking status: Former    Smokeless tobacco: Never   Substance and Sexual Activity    Alcohol use: No    Drug use: Never    Sexual activity: Yes     Review of Systems   Unable to perform ROS: Intubated   Objective:     Vital Signs (Most Recent):  Pulse: (!) 118 (12/01/22 2221)  Resp: (!) 34 (12/01/22 2221)  SpO2: 99 % (12/01/22 2221)   Vital Signs (24h Range):  Pulse:  [118-230] 118  Resp:  [20-34] 34  SpO2:  [94 %-99 %] 99 %     Weight: 120.2 kg (265 lb)  Body mass index is 41.5 kg/m².    SpO2: 99 %  O2 Device (Oxygen Therapy): ventilator    No intake or output data in the 24 hours ending 12/01/22 2315    Lines/Drains/Airways       Drain  Duration                  NG/OG Tube 12/01/22 2230 orogastric 18 Fr. Right mouth <1 day              Airway  Duration                  Airway - Non-Surgical 12/01/22 2203 Endotracheal Tube <1 day              Peripheral Intravenous Line  Duration                  Peripheral IV - Single Lumen 12/01/22  2201 20 G Left Antecubital <1 day         Peripheral IV - Single Lumen 12/01/22 2215 20 G Right Antecubital <1 day         Peripheral IV - Single Lumen 12/01/22 2220 20 G Right Wrist <1 day                    Physical Exam  Constitutional:       Appearance: He is obese. He is not ill-appearing.   HENT:      Head: Normocephalic and atraumatic.   Cardiovascular:      Rate and Rhythm: Normal rate and regular rhythm.      Pulses:           Radial pulses are 2+ on the right side.      Heart sounds: S1 normal and S2 normal. Heart sounds are distant. No murmur heard.    No friction rub. No gallop.   Pulmonary:      Effort: Pulmonary effort is normal.      Breath sounds: Normal breath sounds.      Comments: intubated  Abdominal:      General: There is no distension.      Palpations: Abdomen is soft.   Musculoskeletal:      Cervical back: Normal range of motion.      Right lower leg: No edema.      Left lower leg: No edema.   Skin:     General: Skin is warm and dry.   Neurological:      Comments: UTO   Psychiatric:      Comments: UTO       Current Medications:   aspirin  300 mg Rectal ED 1 Time    lactated ringers  500 mL Intravenous ED 1 Time    propofoL          amiodarone in dextrose 5% 1 mg/min (12/01/22 2222)    [START ON 12/2/2022] amiodarone in dextrose 5%      [START ON 12/2/2022] fentanyl      heparin (porcine) in D5W       [START ON 12/2/2022] heparin (PORCINE), [START ON 12/2/2022] heparin (PORCINE)    Laboratory (all labs reviewed):  CBC:  Recent Labs   Lab 02/17/21  1400 12/01/22  2213 12/01/22  2235   WBC 8.07  --  8.73   Hemoglobin 15.6  --  16.0   POC Hematocrit  --  47  --    Hematocrit 44.3  --  46.4   Platelets 209  --  166       CHEMISTRIES:  Recent Labs   Lab 02/17/21  1400   Glucose 100   Sodium 144   Potassium 2.6 LL   BUN 11   Creatinine 0.8   eGFR if non African American >60   Calcium 9.3       CARDIAC BIOMARKERS:  Recent Labs   Lab 02/17/21  1400 02/17/21  1728 12/01/22  2235   Troponin I 0.011  0.013 0.090 H       COAGS:        LIPIDS/LFTS:  Recent Labs   Lab 02/17/21  1400   AST 24   ALT 32       BNP:  Recent Labs   Lab 02/17/21  1400   BNP <10       TSH:        Free T4:        Diagnostic Results:  ECG (personally reviewed and interpreted tracing(s)):  12/1/22 2232 SR 99, RBBB, high lat BOBBY with recip changes->ST segments much improved on repeat tracing at 1105pm.    Chest X-Ray (personally reviewed and interpreted image(s)): pending    Echo: ordered    Cath: pending neurologic recovery

## 2022-12-02 NOTE — ED NOTES
After receiving Propofol, pt calm, asleep, and resting comfortably; drip paused due to low BP; will continue to closely monitor

## 2022-12-02 NOTE — CARE UPDATE
CPR in progress; pt intubated with 7.5 ETT secured at 25cm with BBS heard and + color change. Placed on Servo with settings as documented; all alarms set and functional. Ambu bag and mask at [facility]. Will continue to monitor and wean as documented.

## 2022-12-02 NOTE — SUBJECTIVE & OBJECTIVE
Past Medical History:   Diagnosis Date    Anxiety     HTN (hypertension)     Hypercholesteremia     Pulmonary embolism        History reviewed. No pertinent surgical history.    Review of patient's allergies indicates:  No Known Allergies    Family History    None       Tobacco Use    Smoking status: Former    Smokeless tobacco: Never   Substance and Sexual Activity    Alcohol use: No    Drug use: Never    Sexual activity: Yes         Review of Systems   Unable to perform ROS: Intubated   Objective:     Vital Signs (Most Recent):  Temp: 97.8 °F (36.6 °C) (12/02/22 1115)  Pulse: 77 (12/02/22 1215)  Resp: 14 (12/02/22 1215)  BP: 133/81 (12/02/22 1215)  SpO2: (!) 94 % (12/02/22 1215)   Vital Signs (24h Range):  Temp:  [97.5 °F (36.4 °C)-98.2 °F (36.8 °C)] 97.8 °F (36.6 °C)  Pulse:  [] 77  Resp:  [0-34] 14  SpO2:  [93 %-100 %] 94 %  BP: ()/(51-81) 133/81     Weight: 111.2 kg (245 lb 2.4 oz)  Body mass index is 38.4 kg/m².      Intake/Output Summary (Last 24 hours) at 12/2/2022 1239  Last data filed at 12/2/2022 1225  Gross per 24 hour   Intake 1311.12 ml   Output 450 ml   Net 861.12 ml       Physical Exam  Constitutional:       General: He is not in acute distress.     Appearance: He is obese. He is not ill-appearing, toxic-appearing or diaphoretic.   HENT:      Head: Normocephalic and atraumatic.      Nose: Nose normal.      Mouth/Throat:      Mouth: Mucous membranes are moist.   Eyes:      General: No scleral icterus.     Extraocular Movements: Extraocular movements intact.      Pupils: Pupils are equal, round, and reactive to light.   Cardiovascular:      Rate and Rhythm: Normal rate and regular rhythm.      Heart sounds: No murmur heard.    No friction rub. No gallop.   Pulmonary:      Effort: No respiratory distress.      Breath sounds: No wheezing.      Comments: Mechanical breath sounds  Abdominal:      General: Abdomen is flat. Bowel sounds are normal. There is no distension.      Palpations: Abdomen  is soft.      Tenderness: There is no abdominal tenderness.   Musculoskeletal:         General: Normal range of motion.      Cervical back: Normal range of motion.      Right lower leg: No edema.      Left lower leg: No edema.   Skin:     General: Skin is warm.   Neurological:      General: No focal deficit present.      Mental Status: He is alert.       Vents:  Vent Mode: (S) PS/CPAP (Dr. Purvis) (12/02/22 1214)  Ventilator Initiated: Yes (12/01/22 2221)  Set Rate: 30 BPM (12/02/22 1115)  Vt Set: 420 mL (12/02/22 1115)  Pressure Support: 5 cmH20 (12/02/22 1214)  PEEP/CPAP: 5 cmH20 (12/02/22 1214)  Oxygen Concentration (%): 40 (12/02/22 1215)  Peak Airway Pressure: 11.4 cmH20 (12/02/22 1214)  Total Ve: 9.4 L/m (12/02/22 1214)  F/VT Ratio<105 (RSBI): 110.29 (12/02/22 1214)    Lines/Drains/Airways       Peripherally Inserted Central Catheter Line  Duration             PICC Triple Lumen 12/02/22 0233 right basilic <1 day              Drain  Duration                  NG/OG Tube 12/01/22 2230 orogastric 18 Fr. Right mouth <1 day         Urethral Catheter 12/01/22 2310 Coude 16 Fr. <1 day              Airway  Duration                  Airway - Non-Surgical 12/01/22 2203 Endotracheal Tube <1 day              Peripheral Intravenous Line  Duration                  Peripheral IV - Single Lumen 12/01/22 2201 20 G Left Antecubital <1 day         Peripheral IV - Single Lumen 12/01/22 2215 20 G Right Antecubital <1 day                    Significant Labs:    CBC/Anemia Profile:  Recent Labs   Lab 12/01/22 2235 12/02/22 0417 12/02/22  0606   WBC 8.73 11.14 10.61   HGB 16.0 13.0* 13.8*   HCT 46.4 39.4* 39.1*    160 179   MCV 91 90 88   RDW 12.6 12.7 12.6        Chemistries:  Recent Labs   Lab 12/01/22 2235 12/02/22 0417    140   K 2.9* 2.9*    105   CO2 18* 25   BUN 18 26*   CREATININE 1.1 1.9*   CALCIUM 9.2 8.6*   ALBUMIN 3.8  --    PROT 7.9  --    BILITOT 0.5  --    ALKPHOS 133  --    ALT 55*  --    AST 45*   --    MG 3.7* 3.3*   PHOS 3.6 2.3*       All pertinent labs within the past 24 hours have been reviewed.    Significant Imaging:   I have reviewed all pertinent imaging results/findings within the past 24 hours.

## 2022-12-02 NOTE — PROGRESS NOTES
[facility] Intensive Care  Cardiology  Progress Note    Patient Name: Charles Kapoor  MRN: [id number]  Admission Date: 12/1/2022  Hospital Length of Stay: 1 days  Code Status: Full Code   Attending Physician: Sergio Donald MD   [facility] Phys[facility]: [facility]  Expected Discharge Date:   Principal Problem:Cardiac arrest    Subjective:     Hospital Course:   No notes on file    Interval History: FU ecg does not show ST elevation    Review of Systems   Unable to perform ROS: Intubated   Objective:     Vital Signs (Most Recent):  Temp: 97.6 °F (36.4 °C) (12/02/22 0454)  Pulse: (!) 58 (12/02/22 0816)  Resp: (!) 30 (12/02/22 0816)  BP: (!) 92/58 (12/02/22 0745)  SpO2: 100 % (12/02/22 0816)   Vital Signs (24h Range):  Temp:  [97.6 °F (36.4 °C)-98.2 °F (36.8 °C)] 97.6 °F (36.4 °C)  Pulse:  [] 58  Resp:  [0-34] 30  SpO2:  [94 %-100 %] 100 %  BP: ()/(51-69) 92/58     Weight: 111.2 kg (245 lb 2.4 oz)  Body mass index is 38.4 kg/m².     SpO2: 100 %  O2 Device (Oxygen Therapy): ventilator      Intake/Output Summary (Last 24 hours) at 12/2/2022 0847  Last data filed at 12/2/2022 0800  Gross per 24 hour   Intake 949.39 ml   Output 450 ml   Net 499.39 ml       Lines/Drains/Airways       Peripherally Inserted Central Catheter Line  Duration             PICC Triple Lumen 12/02/22 0233 right basilic <1 day              Drain  Duration                  NG/OG Tube 12/01/22 2230 orogastric 18 Fr. Right mouth <1 day         Urethral Catheter 12/01/22 2310 Coude 16 Fr. <1 day              Airway  Duration                  Airway - Non-Surgical 12/01/22 2203 Endotracheal Tube <1 day              Peripheral Intravenous Line  Duration                  Peripheral IV - Single Lumen 12/01/22 2201 20 G Left Antecubital <1 day         Peripheral IV - Single Lumen 12/01/22 2215 20 G Right Antecubital <1 day                    Physical Exam  Vitals reviewed.   Constitutional:       Interventions: He is sedated and  intubated.   Neck:      Vascular: No JVD.   Cardiovascular:      Rate and Rhythm: Regular rhythm. Bradycardia present.      Pulses: Normal pulses.   Pulmonary:      Effort: He is intubated.      Breath sounds: Normal breath sounds.   Chest:      Chest wall: No deformity or swelling.   Abdominal:      General: Bowel sounds are normal.   Musculoskeletal:      Right lower leg: No edema.      Left lower leg: No edema.       Significant Labs: CMP   Recent Labs   Lab 12/01/22 2235 12/02/22 0417    140   K 2.9* 2.9*    105   CO2 18* 25   * 147*   BUN 18 26*   CREATININE 1.1 1.9*   CALCIUM 9.2 8.6*   PROT 7.9  --    ALBUMIN 3.8  --    BILITOT 0.5  --    ALKPHOS 133  --    AST 45*  --    ALT 55*  --    ANIONGAP 19* 10   , CBC   Recent Labs   Lab 12/01/22 2235 12/02/22 0417 12/02/22  0606   WBC 8.73 11.14 10.61   HGB 16.0 13.0* 13.8*   HCT 46.4 39.4* 39.1*    160 179   , Lipid Panel No results for input(s): CHOL, HDL, LDLCALC, TRIG, CHOLHDL in the last 48 hours., and Troponin   Recent Labs   Lab 12/01/22 2235 12/02/22 0417   TROPONINI 0.090* 32.535*       Significant Imaging: Echocardiogram: Transthoracic echo (TTE) complete (Cupid Only):   Results for orders placed or performed during the hospital encounter of 12/01/22   Echo   Result Value Ref Range    BSA 2.29 m2    TDI SEPTAL 0.05 m/s    LV LATERAL E/E' RATIO 10.50 m/s    LV SEPTAL E/E' RATIO 12.60 m/s    LA WIDTH 4.10 cm    IVC diameter 2.74 cm    Left Ventricular Outflow Tract Mean Velocity 0.79 cm/s    Left Ventricular Outflow Tract Mean Gradient 2.82 mmHg    AORTIC VALVE CUSP SEPERATION 2.25 cm    TDI LATERAL 0.06 m/s    PV PEAK VELOCITY 0.85 cm/s    LVIDd 4.98 3.5 - 6.0 cm    IVS 1.51 (A) 0.6 - 1.1 cm    Posterior Wall 1.38 (A) 0.6 - 1.1 cm    Ao root annulus 2.94 cm    LVIDs 3.83 2.1 - 4.0 cm    FS 23 28 - 44 %    LA volume 68.12 cm3    Sinus 3.06 cm    Ascending aorta 3.49 cm    LV mass 303.40 g    LA size 3.80 cm    TAPSE 2.02 cm     Left Ventricle Relative Wall Thickness 0.55 cm    AV mean gradient 4 mmHg    AV valve area 2.81 cm2    AV Velocity Ratio 0.84     AV index (prosthetic) 0.86     MV valve area p 1/2 method 2.10 cm2    E/A ratio 0.81     Mean e' 0.06 m/s    E wave deceleration time 198.60 msec    LVOT diameter 2.04 cm    LVOT area 3.3 cm2    LVOT peak jorge l 1.08 m/s    LVOT peak VTI 21.10 cm    Ao peak jorge l 1.28 m/s    Ao VTI 24.5 cm    LVOT stroke volume 68.93 cm3    AV peak gradient 7 mmHg    E/E' ratio 11.45 m/s    MV Peak E Jorge L 0.63 m/s    TR Max Jorge L 1.70 m/s    MV stenosis pressure 1/2 time 105.01 ms    MV Peak A Jorge L 0.78 m/s    LV Systolic Volume 62.99 mL    LV Systolic Volume Index 28.6 mL/m2    LV Diastolic Volume 117.11 mL    LV Diastolic Volume Index 53.23 mL/m2    LA Volume Index 31.0 mL/m2    LV Mass Index 138 g/m2    RA Major Axis 5.16 cm    Left Atrium Minor Axis 4.10 cm    Left Atrium Major Axis 6.90 cm    Triscuspid Valve Regurgitation Peak Gradient 12 mmHg    RA Width 3.74 cm    EF 50 %    Narrative    · Technically difficult study.  · The left ventricle is normal in size with mild concentric hypertrophy   and low normal systolic function.  · The estimated ejection fraction is 50-55%.  · Normal left ventricular diastolic function.  · Normal right ventricular size with normal right ventricular systolic   function.  · There is no pulmonary hypertension.  · Mechanically ventilated; cannot use inferior caval vein diameter to   estimate central venous pressure.        Assessment and Plan:     Brief HPI:     * Cardiac arrest  VF arrest with ROSC after ACLS  CAHP score suggests good prognosis.  Initial concern for STEMI, ST segments have come down with medical stabilization  Admit pt to ICU on hospitalist svc  Cont IV amio/heparin  Cont ASA/Plavix/statin  Check echo  Cath pending clinical course and neurologic improvement    12/02/2022 - follow-up EKG does not show ST elevation.  Troponin has increased to 32.  Continue  ASA/Plavix. Continue ASA. Echocardiogram showed ejection fraction of roughly 50%.  As long as hemodynamics maintained no need for emergent catheterization at this point.  Correct electrolytes.    PRAKASH (acute kidney injury)  12/02/2022-in setting of cardiac arrest.  Hypotension.  Continue pressors.  IV fluids.  Monitor.        VTE Risk Mitigation (From admission, onward)         Ordered     heparin 25,000 units in dextrose 5% (100 units/ml) IV bolus from bag - ADDITIONAL PRN BOLUS - 60 units/kg (max bolus 4000 units)  As needed (PRN)        Question:  Heparin Infusion Adjustment (DO NOT MODIFY ANSWER)  Answer:  \[email or web address]\Images\Pharmacy\HeparinInfusions\heparin LOW INTENSITY nomogram for [facility] [id number]    12/01/22 2302     heparin 25,000 units in dextrose 5% (100 units/ml) IV bolus from bag - ADDITIONAL PRN BOLUS - 30 units/kg (max bolus 4000 units)  As needed (PRN)        Question:  Heparin Infusion Adjustment (DO NOT MODIFY ANSWER)  Answer:  \[email or web address]\Images\Pharmacy\HeparinInfusions\heparin LOW INTENSITY nomogram for [facility] [id number]    12/01/22 2302     IP VTE HIGH RISK PATIENT  Once         12/01/22 2332     Place sequential compression device  Until discontinued         12/01/22 2332     heparin 25,000 units in dextrose 5% 250 mL (100 units/mL) infusion LOW INTENSITY nomogram - [facility]  Continuous        Question Answer Comment   Heparin Infusion Adjustment (DO NOT MODIFY ANSWER) \\[email or web address]\Images\Pharmacy\HeparinInfusions\heparin LOW INTENSITY nomogram for [facility] [id number]    Begin at (in units/kg/hr) 12        12/01/22 2302              Critical Care Time: 40 minutes     Critical care was time spent personally by me on the following activities: development of treatment plan with patient or surrogate and bedside caregivers, discussions with consultants, evaluation of patient's response to treatment, examination of patient, ordering and performing treatments and interventions, ordering and  review of laboratory studies, ordering and review of radiographic studies, pulse oximetry, re-evaluation of patient's condition. This critical care time did not overlap with that of any other provider or involve time for any procedures.    Sherman Hussein MD  Cardiology  [facility] - [place] - Intensive Care

## 2022-12-02 NOTE — PROCEDURES
Charles Kapoor is a 61 y.o. male patient.    Temp: 98.2 °F (36.8 °C) (12/02/22 0212)  Pulse: 60 (12/02/22 0212)  Resp: (!) 30 (12/02/22 0212)  BP: 92/60 (12/02/22 0212)  SpO2: 98 % (12/02/22 0212)  Weight: 120.2 kg (265 lb) (12/01/22 2231)    PICC  Date/Time: 12/2/2022 2:33 AM  Performed by: Jeronimo Villar RN  Consent Done: Yes  Time out: Immediately prior to procedure a time out was called to verify the correct patient, procedure, equipment, support staff and site/side marked as required  Indications: med administration and vascular access  Anesthesia: local infiltration  Local anesthetic: lidocaine 1% without epinephrine  Anesthetic Total (mL): 2  Preparation: skin prepped with ChloraPrep  Skin prep agent dried: skin prep agent completely dried prior to procedure  Sterile barriers: all five maximum sterile barriers used - cap, mask, sterile gown, sterile gloves, and large sterile sheet  Hand hygiene: hand hygiene performed prior to central venous catheter insertion  Location details: right basilic  Catheter type: triple lumen  Catheter size: 5 Fr  Catheter Length: 38cm    Ultrasound guidance: yes  Vessel Caliber: large and patent, compressibility normal  Vascular Doppler: not done  Needle advanced into vessel with real time Ultrasound guidance.  Guidewire confirmed in vessel.  Sterile sheath used.  no esophageal manometryNumber of attempts: 1  Post-procedure: blood return through all ports, chlorhexidine patch and sterile dressing applied          Name Jeronimo Villar RN  12/2/2022

## 2022-12-02 NOTE — ED NOTES
While attempting to prepare pt for PICC line insertion, pt became awake, biting/crunching on ET tube, restless, confused, and attempting to pull at lines and sit up; multiple nurses at bedside; Versed drip titrated to max dose and MD to order Propofol drip; will continue to monitor from bedside

## 2022-12-02 NOTE — SUBJECTIVE & OBJECTIVE
Past Medical History:   Diagnosis Date    Anxiety     HTN (hypertension)     Hypercholesteremia     Pulmonary embolism        No past surgical history on file.    Review of patient's allergies indicates:  No Known Allergies    No current facility-administered medications on file prior to encounter.     Current Outpatient Medications on File Prior to Encounter   Medication Sig    amLODIPine (NORVASC) 10 MG tablet Take 1 tablet (10 mg total) by mouth once daily.    aspirin (ECOTRIN) 81 MG EC tablet Take 81 mg by mouth once daily.    atenoloL (TENORMIN) 25 MG tablet Take 2 tablets (50 mg total) by mouth once daily. for 7 days    HYDROcodone-acetaminophen (NORCO) 5-325 mg per tablet Take 1 tablet by mouth every 4 (four) hours as needed.    ibuprofen (ADVIL,MOTRIN) 800 MG tablet Take 1 tablet (800 mg total) by mouth every 6 (six) hours as needed for Pain.    pravastatin (PRAVACHOL) 20 MG tablet Take 1 tablet (20 mg total) by mouth once daily.     Family History    None       Tobacco Use    Smoking status: Former    Smokeless tobacco: Never   Substance and Sexual Activity    Alcohol use: No    Drug use: Never    Sexual activity: Yes     Review of Systems   Unable to perform ROS: Intubated   Objective:     Vital Signs (Most Recent):  Temp: 98 °F (36.7 °C) (12/02/22 0042)  Pulse: (!) 118 (12/01/22 2221)  Resp: (!) 34 (12/01/22 2221)  SpO2: 99 % (12/01/22 2221)   Vital Signs (24h Range):  Temp:  [98 °F (36.7 °C)] 98 °F (36.7 °C)  Pulse:  [118-230] 118  Resp:  [20-34] 34  SpO2:  [94 %-99 %] 99 %     Weight: 120.2 kg (265 lb)  Body mass index is 41.5 kg/m².    Physical Exam  Constitutional:       Comments: Intubated/sedated    HENT:      Head: Normocephalic and atraumatic.      Nose: Nose normal.      Mouth/Throat:      Mouth: Mucous membranes are moist.      Comments: ETT in place   Cardiovascular:      Rate and Rhythm: Tachycardia present.      Pulses: Normal pulses.      Heart sounds: No murmur heard.  Pulmonary:       Effort: Pulmonary effort is normal.      Breath sounds: Wheezing present.   Abdominal:      General: Abdomen is flat.      Palpations: Abdomen is soft.   Genitourinary:     Comments: Hematuria 2/2 traumatic nieves   Musculoskeletal:      Right lower leg: No edema.      Left lower leg: No edema.   Skin:     General: Skin is warm.      Capillary Refill: Capillary refill takes less than 2 seconds.   Neurological:      Comments: Intubated/sedated            Significant Labs: All pertinent labs within the past 24 hours have been reviewed.    Significant Imaging: I have reviewed all pertinent imaging results/findings within the past 24 hours.

## 2022-12-02 NOTE — CARE UPDATE
Patient seen and examined.  See H/P, treatment and plan per Dr. Gray.  62 y/o male admitted with VF arrest with ROSC after ACLS.    Initial concern for STEMI and Cardiology consulted.  ST segments have come down with medical stabilization.  Replacing electrolytes.  Troponin up to 32.    Cont IV amio/heparin  Cont ASA/Plavix/statin  Check echo  Cards planning LHC once medically stable.  Continue current management.

## 2022-12-02 NOTE — HPI
Charles Kapoor is a 61 year old male with obesity, HTN, HLD who presented to the ER with chest pain.  In the waiting room he became unresponsive, had an episode of urinary incontinence, and was snoring.  He was placed on cardiac monitor and noted to be in torsade and eventually cardiac arrest.  Noted to be in v. Fib and underwent ACLS with epi x 5, calcium chloride, amiodarone bolus, magnesium sulfate x 2, sodium bicarbonate x 1 and was defibrillated x 4 (2 x 200 and 2 x 400 joules). Patient intubated during code. Post arrest EKG showed suspicious ST elevations, but upon further review cardiology elected to start medical management with aspirin, plavix, heparin gtt and amiodarone gtt.  Repeat EKG did not show ST elevations.  PICC placed, and admitted to MICU for further management.  He had multiple lab abnormalities with hypokalemia, elevated troponin, lactic acidosis.  He required large amounts of sedation for aggitation.  He was moving all extremities following arrest, but was confused.

## 2022-12-02 NOTE — ASSESSMENT & PLAN NOTE
"Presented with chest pain   Had a "seizure like" event in the ED, noted to be snoring and urinated on himself  Initial rhythm was torsade and later went into vfib requiring CPR, defibrillation   EKG showed ST elevation, cardiology evaluated the patient with initial plans to activate the cath lab, however, later his ST elevations improved and an intervention was deferred at this time.   Agitated, fighting sedation post arrest   DDx: likely cardiac etiology; ACS or arrhythmias 2/2 electrolyte derangements (likely hypokalemia), PE, or seizure disorder (unlikely, as history is not convincing). Other differentials considered less likely; myocarditis/cardiomyopathy, sepsis, intracranial process.     Plan:   - Cardiology consult   - Trend troponin, telemetry monitoring   - Maintain ET tube, titrate vent to ABGs   - Will hold off on TTM given patients response after CPR.   - Replace electrolytes   - CT head without contrast, CTA chest   - Echocardiogram   - UDS  - PICC line, maintain MAPs > 65   - Will hold off on infectious work up at this time given that initial rhythm is more suggestive of a cardiac etiology   "

## 2022-12-02 NOTE — PLAN OF CARE
Problem: Adult Inpatient Plan of Care  Goal: Plan of Care Review  Outcome: Ongoing, Progressing     Patient came up to ICU this morning from ER around 04:30. With initial Chief complaint of Chest pain. While waiting in the ER waiting room He was found  snoring loudly in Agonal breathing with seizure like activity. While in ER He coded and ACLS protocol was initiated. Currently is intubated and sedated with Fentanyl, Versed and Propofol. He is responding to vigorous stimuli and easily gets agitated. Positive for gag reflex. Unable to follow directions. Bilateral wrist restraints were noted and monitored closely. Blood pressure was borderline low. Propofol was weaned off and eventually Levophed was initiated to keep MAP >65 mmHg. 150 ml of blood tinged urine was noted on the nieves bag. Patient is also on amiodarone drip and Heparin drip. No skin breakdown or pressure injury noted during this admit. Will continue to monitor closely.

## 2022-12-02 NOTE — ED NOTES
Pt's cousin Joes arrived at bedside; given update or situation and plan of care for ICU admission, understanding verbalized

## 2022-12-02 NOTE — ED NOTES
Pt awake, restless, biting on ET tube, and attempting to pull at lines/monitors; MD at bedside and orders to increase titration of sedation medication at this time and will put in orders for IV push sedation medicine and soft wrist restraints for pt's safety

## 2022-12-02 NOTE — PLAN OF CARE
Problem: Infection  Goal: Absence of Infection Signs and Symptoms  Outcome: Ongoing, Progressing     Problem: Adult Inpatient Plan of Care  Goal: Plan of Care Review  Outcome: Ongoing, Progressing  Goal: Patient-Specific Goal (Individualized)  Outcome: Ongoing, Progressing  Goal: Absence of Hospital-Acquired Illness or Injury  Outcome: Ongoing, Progressing  Goal: Optimal Comfort and Wellbeing  Outcome: Ongoing, Progressing  Goal: Readiness for Transition of Care  Outcome: Ongoing, Progressing     Problem: Bariatric Environmental Safety  Goal: Safety Maintained with Care  Outcome: Ongoing, Progressing     Problem: Fall Injury Risk  Goal: Absence of Fall and Fall-Related Injury  Outcome: Ongoing, Progressing     Problem: Skin Injury Risk Increased  Goal: Skin Health and Integrity  Outcome: Ongoing, Progressing     Problem: Fluid and Electrolyte Imbalance (Acute Kidney Injury/Impairment)  Goal: Fluid and Electrolyte Balance  Outcome: Ongoing, Progressing     Problem: Oral Intake Inadequate (Acute Kidney Injury/Impairment)  Goal: Optimal Nutrition Intake  Outcome: Ongoing, Progressing     Problem: Renal Function Impairment (Acute Kidney Injury/Impairment)  Goal: Effective Renal Function  Outcome: Ongoing, Progressing

## 2022-12-02 NOTE — ASSESSMENT & PLAN NOTE
VF arrest with ROSC after ACLS  CAHP score suggests good prognosis.  Initial concern for STEMI, ST segments have come down with medical stabilization  Admit pt to ICU on hospitalist svc  Cont IV amio/heparin  Cont ASA/Plavix/statin  Check echo  Cath pending clinical course and neurologic improvement    12/02/2022 - follow-up EKG does not show ST elevation.  Troponin has increased to 32.  Continue ASA/Plavix. Continue ASA. Echocardiogram showed ejection fraction of roughly 50%.  As long as hemodynamics maintained no need for emergent catheterization at this point.  Correct electrolytes.

## 2022-12-02 NOTE — HPI
The patient is a 61-year-old man who presented to the emergency room with cardiac arrest.  He apparently walked into the waiting room, and was subsequently found to be in torsade.  He underwent ACLS with multiple rounds of epinephrine and CPR.  Initially was shocked back into a sinus rhythm right bundle-branch block and concern for lateral ST elevation.  After stabilization and initiation of amiodarone with intubation, the patient's ST segments have come down.  He does have some ST depression in the anterior leads.  There is no further ST elevation.  While initially comatose, he appears to now be making purposeful movements.  His blood pressure and his heart rate are stable.  Given the absence of ST elevation, and unclear but improved neurologic function, I plan to defer emergency catheterization.  The patient will be admitted to intensive care unit on the hospitalist service.  I would recommend continuation of aspirin, Plavix, intravenous heparin, as well as intravenous amiodarone.  I have discussed the case with Dr. Dao.

## 2022-12-02 NOTE — CARE UPDATE
[place] - Intensive Care  ICU Shift Summary  Date: 12/2/2022      Prehospitalization: Home  Admit Date / LOS : 12/1/2022/ 1 days    Diagnosis: Cardiac arrest    Consults:        Active: Cardio       Needed: Pulm CC     Code Status: Full Code   Advanced Directive: <no information>    LDA:  Lines/Drains/Airways       Peripherally Inserted Central Catheter Line  Duration             PICC Triple Lumen 12/02/22 0233 right basilic <1 day              Drain  Duration                  NG/OG Tube 12/01/22 2230 orogastric 18 Fr. Right mouth <1 day         Urethral Catheter 12/01/22 2310 Coude 16 Fr. <1 day              Airway  Duration                  Airway - Non-Surgical 12/01/22 2203 Endotracheal Tube <1 day              Peripheral Intravenous Line  Duration                  Peripheral IV - Single Lumen 12/01/22 2201 20 G Left Antecubital <1 day         Peripheral IV - Single Lumen 12/01/22 2215 20 G Right Antecubital <1 day                  Central Lines/Site/Justification:Pressors  Urinary Cath/Order/Justification:Critically Ill in the ICU and requiring intensive monitoring    Vasopressors/Infusions:    amiodarone in dextrose 5% 0.5 mg/min (12/02/22 0555)    fentanyl 150 mcg/hr (12/02/22 0555)    heparin (porcine) in D5W 12 Units/kg/hr (12/02/22 0555)    midazolam 5 mg/hr (12/02/22 0555)    NORepinephrine bitartrate-D5W 0.02 mcg/kg/min (12/02/22 0555)    propofoL Stopped (12/02/22 0427)          GOALS: Volume/ Hemodynamic: MAP >65 mmHg                     RASS: -3  moderate sedation, movement or eye opening; no eye contact    Pain Management: none       Pain Controlled: yes     Rhythm: SB    Respiratory Device: Vent    Vent Mode: PRVC  Oxygen Concentration (%):  [] 70  Resp Rate Total:  [30 br/min-50 br/min] 30 br/min  Vt Set:  [500 mL] 500 mL  PEEP/CPAP:  [5 cmH20] 5 cmH20  Mean Airway Pressure:  [4.6 cpR75-37.7 cmH20] 11.7 cmH20             Most Recent SBT/ SAT: N/A       MOVE Screen: PT Consult  ICU  Liberation: yes    VTE Prophylaxis: Pharm and Mechanical  Mobility: Bedrest  Stress Ulcer Prophylaxis: Yes    Isolation: No active isolations    Dietary:   Current Diet Order   Procedures    Diet NPO      Tolerance: not applicable  Advancement:     I & O (24h):    Intake/Output Summary (Last 24 hours) at 12/2/2022 0624  Last data filed at 12/2/2022 0559  Gross per 24 hour   Intake 599.8 ml   Output 450 ml   Net 149.8 ml        Restraints: Yes    Significant Dates:  Post Op Date:   Rescue Date:   Imaging/ Diagnostics:     Noteworthy Labs:      COVID Test: (--)  CBC/Anemia Labs: Coags:    Recent Labs   Lab 12/01/22 2235 12/02/22 0417   WBC 8.73 11.14   HGB 16.0 13.0*   HCT 46.4 39.4*    160   MCV 91 90   RDW 12.6 12.7    Recent Labs   Lab 12/01/22 2342 12/02/22 0417   INR 1.0  --    APTT 51.7* 36.5*        Chemistries:   Recent Labs   Lab 12/01/22 2235 12/02/22 0417    140   K 2.9* 2.9*    105   CO2 18* 25   BUN 18 26*   CREATININE 1.1 1.9*   CALCIUM 9.2 8.6*   PROT 7.9  --    BILITOT 0.5  --    ALKPHOS 133  --    ALT 55*  --    AST 45*  --    MG 3.7* 3.3*   PHOS 3.6 2.3*        Cardiac Enzymes: Ejection Fractions:    Recent Labs     12/01/22 2235 12/02/22 0417   TROPONINI 0.090* 32.535*    No results found for: EF     POCT Glucose: HbA1c:    Recent Labs   Lab 12/02/22  0105   POCTGLUCOSE 178*    No results found for: HGBA1C        ICU LOS 2h  Level of Care: Critical Care    Chart Check: 24 HR Done  Shift Summary/Plan for the shift: Please see Care Planning Notes

## 2022-12-02 NOTE — ASSESSMENT & PLAN NOTE
Making good urine, likely some component of ATN following ventricular arrhythmia and cardiac arrest.  - daily renal function panel.

## 2022-12-02 NOTE — ASSESSMENT & PLAN NOTE
VF arrest with ROSC after ACLS  CAHP score suggests good prognosis.  Initial concern for STEMI, ST segments have come down with medical stabilization  Admit pt to ICU on hospitalist svc  Cont IV amio/heparin  Cont ASA/Plavix/statin  Check echo  Cath pending clinical course and neurologic improvement

## 2022-12-02 NOTE — CARE UPDATE
[facility] [place]es [place] -- 4 [place]      12/2/2022       Skin assessed on: Admit      [x] No Pressure Injuries Present    [x]Prevention Measures Documented    [] Yes LDA  for Pressure Injury Previously documented     [] Yes New Pressure Injury Discovered   [] LDA for New Pressure Injury Added      Attending RN:  oRbert Mendez Jr., RN

## 2022-12-02 NOTE — ED PROVIDER NOTES
"Encounter Date: 12/1/2022       History     Chief Complaint   Patient presents with    Seizures     While patient in waiting room waiting for Quick Look noted to have snoring respirations and tonic clonic seizure activity, stretcher obtained and patient roomed to ED 17    Chest Pain     HPI    61-year-old male with past medical history of hypertension, remote PE who presents with chest pain.  Per report, patient arrived to the ED and checked in registration with a complaint of chest pain.  He was then noted in the ED to be "snoring" by nursing staff prior to being triaged.  Upon further evaluation, the patient was noted to be actively seizing. He was then taken to room 17 were he went into cardiac arrest. Initial rhythm was noted to be torsades which then went into Vifb.     Jose (Cousin): [phone]    Review of patient's allergies indicates:  No Known Allergies  Past Medical History:   Diagnosis Date    Anxiety     HTN (hypertension)     Hypercholesteremia     Pulmonary embolism      No past surgical history on file.  No family history on file.  Social History     Tobacco Use    Smoking status: Former    Smokeless tobacco: Never   Substance Use Topics    Alcohol use: No    Drug use: Never     Review of Systems   Unable to perform ROS: Acuity of condition     Physical Exam     Initial Vitals [12/01/22 2213]   BP Pulse Resp Temp SpO2   -- (!) 230 20 -- (!) 94 %      MAP       --         Physical Exam    Nursing note and vitals reviewed.  Constitutional: He appears well-developed. He is not diaphoretic. He is Obese .   HENT:   Head: Normocephalic and atraumatic.   Right Ear: External ear normal.   Left Ear: External ear normal.   Eyes:   Fixed, non-responsive. Corneal scarring    Neck: No tracheal deviation present. No JVD present.   Cardiovascular:            CPR in progress   Pulmonary/Chest: Apnea noted.   Abdominal: Abdomen is soft. He exhibits no distension.   Musculoskeletal:         General: No edema. "     Neurological: He is unresponsive. GCS eye subscore is 1. GCS verbal subscore is 1. GCS motor subscore is 1.   No purposeful movements   Skin: Skin is warm and dry.       ED Course   Critical Care    Date/Time: 12/2/2022 2:05 AM  Performed by: Estefanía Dao DO  Authorized by: Estefanía Dao DO   Direct patient critical care time: 45 minutes  Additional history critical care time: 10 minutes  Ordering / reviewing critical care time: 10 minutes  Documentation critical care time: 10 minutes  Consulting other physicians critical care time: 15 minutes  Consult with family critical care time: 10 minutes  Total critical care time (exclusive of procedural time) : 100 minutes  Critical care time was exclusive of separately billable procedures and treating other patients and teaching time.  Critical care was necessary to treat or prevent imminent or life-threatening deterioration of the following conditions: cardiac failure.  Critical care was time spent personally by me on the following activities: development of treatment plan with patient or surrogate, discussions with consultants, gastric intubation, interpretation of cardiac output measurements, evaluation of patient's response to treatment, examination of patient, obtaining history from patient or surrogate, ordering and performing treatments and interventions, ordering and review of laboratory studies, ordering and review of radiographic studies, pulse oximetry, re-evaluation of patient's condition, review of old charts and ventilator management.      Labs Reviewed   CBC W/ AUTO DIFFERENTIAL - Abnormal; Notable for the following components:       Result Value    MCH 31.3 (*)     Gran % 34.3 (*)     Lymph % 54.3 (*)     All other components within normal limits   COMPREHENSIVE METABOLIC PANEL - Abnormal; Notable for the following components:    Potassium 2.9 (*)     CO2 18 (*)     Glucose 143 (*)     AST 45 (*)     ALT 55 (*)     Anion Gap 19  (*)     All other components within normal limits   LACTIC ACID, PLASMA - Abnormal; Notable for the following components:    Lactate (Lactic Acid) 6.1 (*)     All other components within normal limits    Narrative:     LA. Critical result called and verbal readback obtained from Madhuri Dao RN @ [id number] on 01Dec2022. BML    (Nurse advised on the effect of the 2+ hemolysis. BML) by BL1   12/01/2022 23:35   MAGNESIUM - Abnormal; Notable for the following components:    Magnesium 3.7 (*)     All other components within normal limits   TROPONIN I - Abnormal; Notable for the following components:    Troponin I 0.090 (*)     All other components within normal limits   ISTAT PROCEDURE - Abnormal; Notable for the following components:    POC Glucose 140 (*)     POC Potassium 2.8 (*)     All other components within normal limits   ISTAT PROCEDURE - Abnormal; Notable for the following components:    POC PH 7.214 (*)     POC PCO2 48.4 (*)     POC PO2 249 (*)     POC HCO3 19.5 (*)     POC TCO2 21 (*)     All other components within normal limits   B-TYPE NATRIURETIC PEPTIDE   ALCOHOL,MEDICAL (ETHANOL)   PHOSPHORUS   TSH   URINALYSIS, REFLEX TO URINE CULTURE   DRUG SCREEN PANEL, URINE EMERGENCY   APTT   PROTIME-INR   SARS-COV-2 RDRP GENE   ISTAT CHEM8   ISTAT CHEM8   POCT GLUCOSE MONITORING CONTINUOUS     EKG Readings: (Independently Interpreted)   EKG obtained at 10:32 p.m. shows normal sinus rhythm with a rate of 99 beats per minute, ST elevations in the lateral leads with depressions in the inferior lateral precordial leads.     Imaging Results              X-Ray Abdomen AP 1 View (KUB) (Final result)  Result time 12/01/22 23:34:06      Final result by Segundo Cardoso MD (12/01/22 23:34:06)                   Impression:      OG tube in place.      Electronically signed by: Segundo Cardoso MD  Date:    12/01/2022  Time:    23:34               Narrative:    EXAMINATION:  XR ABDOMEN AP 1 VIEW    CLINICAL HISTORY:  Encounter for  other specified special examinations    TECHNIQUE:  AP View(s) of the abdomen was performed.    COMPARISON:  None    FINDINGS:  Enteric tube extends well below the diaphragm into the stomach.  There is gaseous distention of the stomach seen.                                       X-Ray Chest AP Portable (Final result)  Result time 12/01/22 23:33:36      Final result by Segundo Cardoso MD (12/01/22 23:33:36)                   Impression:      As above.      Electronically signed by: Segundo Cardoso MD  Date:    12/01/2022  Time:    23:33               Narrative:    EXAMINATION:  XR CHEST AP PORTABLE    CLINICAL HISTORY:  Presence of other specified devices    TECHNIQUE:  Single frontal view of the chest was performed.    COMPARISON:  February 2021.    FINDINGS:  Cardiac silhouette appears mildly enlarged but stable in size.  Lungs are hypoinflated which accentuates pulmonary vascular markings.  ET tube is visualized with distal tip approximately 4 cm above the randy.  No evidence of focal consolidation, pneumothorax, or large pleural effusion.                                       Medications   amiodarone 360 mg/200 mL (1.8 mg/mL) infusion (1 mg/min Intravenous New Bag 12/1/22 2222)   amiodarone 360 mg/200 mL (1.8 mg/mL) infusion (has no administration in time range)   aspirin suppository 300 mg (has no administration in time range)   propofoL (DIPRIVAN) 10 mg/mL infusion (has no administration in time range)   heparin 25,000 units in dextrose 5% 250 mL (100 units/mL) infusion LOW INTENSITY nomogram - [facility] (has no administration in time range)   heparin 25,000 units in dextrose 5% (100 units/ml) IV bolus from bag - ADDITIONAL PRN BOLUS - 60 units/kg (max bolus 4000 units) (has no administration in time range)   heparin 25,000 units in dextrose 5% (100 units/ml) IV bolus from bag - ADDITIONAL PRN BOLUS - 30 units/kg (max bolus 4000 units) (has no administration in time range)   fentaNYL 2500 mcg in 0.9% sodium  chloride 250 mL infusion premix (titrating) (25 mcg/hr Intravenous New Bag 12/1/22 3112)   lactated ringers bolus 500 mL (500 mLs Intravenous New Bag 12/1/22 0442)   aspirin chewable tablet 81 mg (has no administration in time range)   clopidogreL tablet 75 mg (has no administration in time range)   atorvastatin tablet 40 mg (has no administration in time range)   potassium chloride 10 mEq in 100 mL IVPB (has no administration in time range)   sodium chloride 0.9% flush 10 mL (has no administration in time range)   acetaminophen tablet 650 mg (has no administration in time range)   HYDROcodone-acetaminophen 5-325 mg per tablet 1 tablet (has no administration in time range)   HYDROcodone-acetaminophen  mg per tablet 1 tablet (has no administration in time range)   famotidine (PF) injection 20 mg (has no administration in time range)   ondansetron injection 4 mg (has no administration in time range)   prochlorperazine injection Soln 5 mg (has no administration in time range)   melatonin tablet 6 mg (has no administration in time range)   potassium chloride 10 mEq in 100 mL IVPB (has no administration in time range)     And   potassium chloride 10 mEq in 100 mL IVPB (has no administration in time range)     And   potassium chloride 10 mEq in 100 mL IVPB (has no administration in time range)   magnesium sulfate 2g in water 50mL IVPB (premix) (has no administration in time range)   magnesium sulfate 2g in water 50mL IVPB (premix) (has no administration in time range)   sodium phosphate 15 mmol in dextrose 5 % 250 mL IVPB (has no administration in time range)   sodium phosphate 20.01 mmol in dextrose 5 % 250 mL IVPB (has no administration in time range)   sodium phosphate 30 mmol in dextrose 5 % 250 mL IVPB (has no administration in time range)   calcium gluconate 1 g in NS IVPB (premixed) (has no administration in time range)   calcium gluconate 1 g in NS IVPB (premixed) (has no administration in time range)    calcium gluconate 1 g in NS IVPB (premixed) (has no administration in time range)   albuterol-ipratropium 2.5 mg-0.5 mg/3 mL nebulizer solution 3 mL (has no administration in time range)   midazolam (VERSED) 1 mg/mL injection 4 mg (has no administration in time range)   midazolam (VERSED) 1 mg/mL injection 4 mg (4 mg Intravenous Given 12/1/22 2228)   clopidogreL tablet 600 mg (600 mg Oral Given 12/1/22 2237)   magnesium sulfate 4 mEq/mL (50 %) injection (2 g Intravenous Given 12/1/22 2211)   EPINEPHrine 0.1 mg/mL injection (1 mg Intravenous Given 12/1/22 2214)   heparin (porcine) injection 4,000 Units (4,000 Units Intravenous Given 12/1/22 2237)   calcium chloride 100 mg/mL (10 %) injection (1 g Intravenous Given 12/1/22 2206)   amiodarone injection (300 mg Intravenous Given 12/1/22 2207)   sodium bicarbonate 8.4 % (1 mEq/mL) injection (50 mEq Intravenous Given 12/1/22 2214)   midazolam (VERSED) 1 mg/mL injection 4 mg (4 mg Intravenous Given 12/1/22 2235)   midazolam (VERSED) 1 mg/mL injection 4 mg (4 mg Intravenous Given 12/1/22 2305)   midazolam (VERSED) 1 mg/mL injection 4 mg (4 mg Intravenous Given 12/1/22 2340)     Medical Decision Making:   Clinical Tests:   Lab Tests: Ordered and Reviewed  Radiological Study: Ordered and Reviewed  Medical Tests: Reviewed and Ordered  ED Management:   [facility]  This is an emergent evaluation of a 61-year-old male with past medical history of hypertension, remote PE who presents with chest pain who presents with a cardiac arrest. Initial vitals in the ED [12/01/22 2213]  BP: n/a  Pulse: (!) 230  Resp: 20  Temp: n/a  SpO2: (!) 94 % .     Physical exam noted above. DDx includes but is not limited to VFib arrest, electrolyte abnormality, MI. Also considered but clinically less likely to be PE, Stroke. Will obtain labs and imaging including CBC, CMP, cardiac enzymes, coags, istat chem 8, TSH, mag/phos, lactate, VBG, EKG, chest xray initially ordered. CPR was started per ACLS protocol.  Initial rhythm appeared to be torsades de Po[place], patient was treated with IV magnesium as well as calcium.  He received several rounds of epinephrine as well as amiodarone, bicarb.  During repeat pulse checks, patient was found to be in VFib and received several rounds of defibrillation as well as double defibrillations. ROSC was obtained. He was started on an amnio drip.  EKG was obtained and appeared to show ST elevations.  Case was discussed with Cardiology, Dr. Alvarado who based of of EKG findings activated the cath lab.  Patient was treated with heparin, Plavix, aspirin.  He was also given Versed pushes for sedation.I initially discussed starting propofol however patient's blood pressure downward trending.  Dr. Alvarado arrived in the ED to evaluate patient and on his assessment stated that patient's ST elevation appeared improved.  He at that time stated the patient did not warrant cath intervention.  He recommended starting a heparin drip and admitting to Hospital Medicine.  A fentanyl drip was ordered for sedation.  Case was discussed with Hospital Medicine, Dr. Gray and patient was placed on the ICU service.  CT head and CTA of the chest were also ordered.  Patient's cousin was given update on patient's status.    Labs reveal an elevated lactic acid, elevated troponin, hypokalemia, elevated magnesium which was after IV push.  VBG with a acidosis.     Estefanía Dao D.O  EMERGENCY MEDICINE  11:59 PM 12/01/2022      This chart was completed using dictation software, as a result there may be some transcription errors                         Clinical Impression:   Final diagnoses:  [Z97.8] Endotracheally intubated  [Z01.89] Encounter for imaging study to confirm orogastric (OG) tube placement  [I46.9] Cardiac arrest        ED Disposition Condition    Admit Stable                Estefanía Dao, DO  12/02/22 0206

## 2022-12-02 NOTE — SUBJECTIVE & OBJECTIVE
Interval History: FU ecg does not show ST elevation    Review of Systems   Unable to perform ROS: Intubated   Objective:     Vital Signs (Most Recent):  Temp: 97.6 °F (36.4 °C) (12/02/22 0454)  Pulse: (!) 58 (12/02/22 0816)  Resp: (!) 30 (12/02/22 0816)  BP: (!) 92/58 (12/02/22 0745)  SpO2: 100 % (12/02/22 0816)   Vital Signs (24h Range):  Temp:  [97.6 °F (36.4 °C)-98.2 °F (36.8 °C)] 97.6 °F (36.4 °C)  Pulse:  [] 58  Resp:  [0-34] 30  SpO2:  [94 %-100 %] 100 %  BP: ()/(51-69) 92/58     Weight: 111.2 kg (245 lb 2.4 oz)  Body mass index is 38.4 kg/m².     SpO2: 100 %  O2 Device (Oxygen Therapy): ventilator      Intake/Output Summary (Last 24 hours) at 12/2/2022 0847  Last data filed at 12/2/2022 0800  Gross per 24 hour   Intake 949.39 ml   Output 450 ml   Net 499.39 ml       Lines/Drains/Airways       Peripherally Inserted Central Catheter Line  Duration             PICC Triple Lumen 12/02/22 0233 right basilic <1 day              Drain  Duration                  NG/OG Tube 12/01/22 2230 orogastric 18 Fr. Right mouth <1 day         Urethral Catheter 12/01/22 2310 Coude 16 Fr. <1 day              Airway  Duration                  Airway - Non-Surgical 12/01/22 2203 Endotracheal Tube <1 day              Peripheral Intravenous Line  Duration                  Peripheral IV - Single Lumen 12/01/22 2201 20 G Left Antecubital <1 day         Peripheral IV - Single Lumen 12/01/22 2215 20 G Right Antecubital <1 day                    Physical Exam  Vitals reviewed.   Constitutional:       Interventions: He is sedated and intubated.   Neck:      Vascular: No JVD.   Cardiovascular:      Rate and Rhythm: Regular rhythm. Bradycardia present.      Pulses: Normal pulses.   Pulmonary:      Effort: He is intubated.      Breath sounds: Normal breath sounds.   Chest:      Chest wall: No deformity or swelling.   Abdominal:      General: Bowel sounds are normal.   Musculoskeletal:      Right lower leg: No edema.      Left lower  leg: No edema.       Significant Labs: CMP   Recent Labs   Lab 12/01/22 2235 12/02/22 0417    140   K 2.9* 2.9*    105   CO2 18* 25   * 147*   BUN 18 26*   CREATININE 1.1 1.9*   CALCIUM 9.2 8.6*   PROT 7.9  --    ALBUMIN 3.8  --    BILITOT 0.5  --    ALKPHOS 133  --    AST 45*  --    ALT 55*  --    ANIONGAP 19* 10   , CBC   Recent Labs   Lab 12/01/22 2235 12/02/22 0417 12/02/22 0606   WBC 8.73 11.14 10.61   HGB 16.0 13.0* 13.8*   HCT 46.4 39.4* 39.1*    160 179   , Lipid Panel No results for input(s): CHOL, HDL, LDLCALC, TRIG, CHOLHDL in the last 48 hours., and Troponin   Recent Labs   Lab 12/01/22 2235 12/02/22 0417   TROPONINI 0.090* 32.535*       Significant Imaging: Echocardiogram: Transthoracic echo (TTE) complete ([facility]upid Only):   Results for orders placed or performed during the hospital encounter of 12/01/22   Echo   Result Value Ref Range    BSA 2.29 m2    TDI SEPTAL 0.05 m/s    LV LATERAL E/E' RATIO 10.50 m/s    LV SEPTAL E/E' RATIO 12.60 m/s    LA WIDTH 4.10 cm    IVC diameter 2.74 cm    Left Ventricular Outflow Tract Mean Velocity 0.79 cm/s    Left Ventricular Outflow Tract Mean Gradient 2.82 mmHg    AORTIC VALVE CUSP SEPERATION 2.25 cm    TDI LATERAL 0.06 m/s    PV PEAK VELOCITY 0.85 cm/s    LVIDd 4.98 3.5 - 6.0 cm    IVS 1.51 (A) 0.6 - 1.1 cm    Posterior Wall 1.38 (A) 0.6 - 1.1 cm    Ao root annulus 2.94 cm    LVIDs 3.83 2.1 - 4.0 cm    FS 23 28 - 44 %    LA volume 68.12 cm3    Sinus 3.06 cm    Ascending aorta 3.49 cm    LV mass 303.40 g    LA size 3.80 cm    TAPSE 2.02 cm    Left Ventricle Relative Wall Thickness 0.55 cm    AV mean gradient 4 mmHg    AV valve area 2.81 cm2    AV Velocity Ratio 0.84     AV index (prosthetic) 0.86     MV valve area p 1/2 method 2.10 cm2    E/A ratio 0.81     Mean e' 0.06 m/s    E wave deceleration time 198.60 msec    LVOT diameter 2.04 cm    LVOT area 3.3 cm2    LVOT peak judson 1.08 m/s    LVOT peak VTI 21.10 cm    Ao peak judson 1.28 m/s     Ao VTI 24.5 cm    LVOT stroke volume 68.93 cm3    AV peak gradient 7 mmHg    E/E' ratio 11.45 m/s    MV Peak E Jorge L 0.63 m/s    TR Max Jorge L 1.70 m/s    MV stenosis pressure 1/2 time 105.01 ms    MV Peak A Jorge L 0.78 m/s    LV Systolic Volume 62.99 mL    LV Systolic Volume Index 28.6 mL/m2    LV Diastolic Volume 117.11 mL    LV Diastolic Volume Index 53.23 mL/m2    LA Volume Index 31.0 mL/m2    LV Mass Index 138 g/m2    RA Major Axis 5.16 cm    Left Atrium Minor Axis 4.10 cm    Left Atrium Major Axis 6.90 cm    Triscuspid Valve Regurgitation Peak Gradient 12 mmHg    RA Width 3.74 cm    EF 50 %    Narrative    · Technically difficult study.  · The left ventricle is normal in size with mild concentric hypertrophy   and low normal systolic function.  · The estimated ejection fraction is 50-55%.  · Normal left ventricular diastolic function.  · Normal right ventricular size with normal right ventricular systolic   function.  · There is no pulmonary hypertension.  · Mechanically ventilated; cannot use inferior caval vein diameter to   estimate central venous pressure.

## 2022-12-02 NOTE — CONSULTS
[facility] Intensive Care  Critical Care Medicine  Consult Note    Patient Name: Charles Kapoor  MRN: [id number]  Admission Date: 12/1/2022  Hospital Length of Stay: 1 days  Code Status: Full Code  Attending Physician: Sergio Donald MD   Primary Care Provider: [facility]   Principal Problem: Cardiac arrest    [unfilled]  Subjective:     HPI:  Charles Kapoor is a 61 year old male with obesity, HTN, HLD who presented to the ER with chest pain.  In the waiting room he became unresponsive, had an episode of urinary incontinence, and was snoring.  He was placed on cardiac monitor and noted to be in torsade and eventually cardiac arrest.  Noted to be in v. Fib and underwent ACLS with epi x 5, calcium chloride, amiodarone bolus, magnesium sulfate x 2, sodium bicarbonate x 1 and was defibrillated x 4 (2 x 200 and 2 x 400 joules). Patient intubated during code. Post arrest EKG showed suspicious ST elevations, but upon further review cardiology elected to start medical management with aspirin, plavix, heparin gtt and amiodarone gtt.  Repeat EKG did not show ST elevations.  PICC placed, and admitted to MICU for further management.  He had multiple lab abnormalities with hypokalemia, elevated troponin, lactic acidosis.  He required large amounts of sedation for aggitation.  He was moving all extremities following arrest, but was confused.        Hospital/ICU Course:  This morning during an SAT, he is mentally appropriate and is now undergoing SBT.      Past Medical History:   Diagnosis Date    Anxiety     HTN (hypertension)     Hypercholesteremia     Pulmonary embolism        History reviewed. No pertinent surgical history.    Review of patient's allergies indicates:  No Known Allergies    Family History    None       Tobacco Use    Smoking status: Former    Smokeless tobacco: Never   Substance and Sexual Activity    Alcohol use: No    Drug use: Never    Sexual activity: Yes         Review of  Systems   Unable to perform ROS: Intubated   Objective:     Vital Signs (Most Recent):  Temp: 97.8 °F (36.6 °C) (12/02/22 1115)  Pulse: 77 (12/02/22 1215)  Resp: 14 (12/02/22 1215)  BP: 133/81 (12/02/22 1215)  SpO2: (!) 94 % (12/02/22 1215)   Vital Signs (24h Range):  Temp:  [97.5 °F (36.4 °C)-98.2 °F (36.8 °C)] 97.8 °F (36.6 °C)  Pulse:  [] 77  Resp:  [0-34] 14  SpO2:  [93 %-100 %] 94 %  BP: ()/(51-81) 133/81     Weight: 111.2 kg (245 lb 2.4 oz)  Body mass index is 38.4 kg/m².      Intake/Output Summary (Last 24 hours) at 12/2/2022 1239  Last data filed at 12/2/2022 1225  Gross per 24 hour   Intake 1311.12 ml   Output 450 ml   Net 861.12 ml       Physical Exam  Constitutional:       General: He is not in acute distress.     Appearance: He is obese. He is not ill-appearing, toxic-appearing or diaphoretic.   HENT:      Head: Normocephalic and atraumatic.      Nose: Nose normal.      Mouth/Throat:      Mouth: Mucous membranes are moist.   Eyes:      General: No scleral icterus.     Extraocular Movements: Extraocular movements intact.      Pupils: Pupils are equal, round, and reactive to light.   Cardiovascular:      Rate and Rhythm: Normal rate and regular rhythm.      Heart sounds: No murmur heard.    No friction rub. No gallop.   Pulmonary:      Effort: No respiratory distress.      Breath sounds: No wheezing.      Comments: Mechanical breath sounds  Abdominal:      General: Abdomen is flat. Bowel sounds are normal. There is no distension.      Palpations: Abdomen is soft.      Tenderness: There is no abdominal tenderness.   Musculoskeletal:         General: Normal range of motion.      Cervical back: Normal range of motion.      Right lower leg: No edema.      Left lower leg: No edema.   Skin:     General: Skin is warm.   Neurological:      General: No focal deficit present.      Mental Status: He is alert.       Vents:  Vent Mode: (S) PS/CPAP (Dr. Purvis) (12/02/22 1216)  Ventilator Initiated: Yes  (12/01/22 2221)  Set Rate: 30 BPM (12/02/22 1115)  Vt Set: 420 mL (12/02/22 1115)  Pressure Support: 5 cmH20 (12/02/22 1214)  PEEP/CPAP: 5 cmH20 (12/02/22 1214)  Oxygen Concentration (%): 40 (12/02/22 1215)  Peak Airway Pressure: 11.4 cmH20 (12/02/22 1214)  Total Ve: 9.4 L/m (12/02/22 1214)  F/VT Ratio<105 (RSBI): 110.29 (12/02/22 1214)    Lines/Drains/Airways       Peripherally Inserted Central Catheter Line  Duration             PICC Triple Lumen 12/02/22 0233 right basilic <1 day              Drain  Duration                  NG/OG Tube 12/01/22 2230 orogastric 18 Fr. Right mouth <1 day         Urethral Catheter 12/01/22 2310 Coude 16 Fr. <1 day              Airway  Duration                  Airway - Non-Surgical 12/01/22 2203 Endotracheal Tube <1 day              Peripheral Intravenous Line  Duration                  Peripheral IV - Single Lumen 12/01/22 2201 20 G Left Antecubital <1 day         Peripheral IV - Single Lumen 12/01/22 2215 20 G Right Antecubital <1 day                    Significant Labs:    CBC/Anemia Profile:  Recent Labs   Lab 12/01/22 2235 12/02/22  0417 12/02/22  0606   WBC 8.73 11.14 10.61   HGB 16.0 13.0* 13.8*   HCT 46.4 39.4* 39.1*    160 179   MCV 91 90 88   RDW 12.6 12.7 12.6        Chemistries:  Recent Labs   Lab 12/01/22 2235 12/02/22 0417    140   K 2.9* 2.9*    105   CO2 18* 25   BUN 18 26*   CREATININE 1.1 1.9*   CALCIUM 9.2 8.6*   ALBUMIN 3.8  --    PROT 7.9  --    BILITOT 0.5  --    ALKPHOS 133  --    ALT 55*  --    AST 45*  --    MG 3.7* 3.3*   PHOS 3.6 2.3*       All pertinent labs within the past 24 hours have been reviewed.    Significant Imaging:   I have reviewed all pertinent imaging results/findings within the past 24 hours.      ABG  Recent Labs   Lab 12/02/22  1138   PH 7.379   PO2 82   PCO2 42.9   HCO3 25.3   BE 0     Assessment/Plan:     Pulmonary  Acute hypoxemic respiratory failure  Following cardiac arrhythmia related arrest.  Suspected to  pulmonary edema.  No findings to suggest pneumonia at this time.  O2 rapidly weaned with control of HR and positive pressure ventilation.    - low tidal volume ventilation while intubated.  - target sats >90%  - BIPAP in winnie-extubation period and PRN  - continue to optimize cardiac function and diurese if indicated.  Take care to ensure electrolytes are corrected if this is needed.    Cardiac/Vascular  * Cardiac arrest  V. Fib arrest presenting with torsades initially.  Responding to ACLS with epi x 5, calcium chloride, amiodarone bolus, magnesium sulfate x 2, sodium bicarbonate x 1 and was defibrillated x 4 (2 x 200 and 2 x 400 joules).  Concern initially for STEMI, but repeat EKG showing no stemi.  Moving all extremities post-code, so no TTM.  The following morning, he was awake during an SAT and answering questions.  Heart rhythm stabilized with electrolyte replenishment and amiodarone load.  On heparin gtt.  - continue to keep K at 4, Mag at 2  - continue amiodarone gtt.  - continue heparin gtt  - cardiology on board and likely to cath in near future, when renal function improves.  - undergoing SBT at this time, and will likely extubate to BIPAP therapy.    Renal/  Gross hematuria  2/2 traumatic nieves placement.  Monitor closely.    Lactic acidosis  Related to cardiac arrest.  Improving with supportive care and correction of arrhythmia.    Hypophosphatemia  Ensure repletion to 3    Hypokalemia  Ensure repletion to 4 given arrest with torsades.    - BMP q12 hrs.    PRAKASH (acute kidney injury)  Making good urine, likely some component of ATN following ventricular arrhythmia and cardiac arrest.  - daily renal function panel.          Critical Care Daily Checklist:    A: Awake: RASS Goal/Actual Goal:    Actual:     B: Spontaneous Breathing Trial Performed?     C: SAT & SBT Coordinated?  Yes                      D: Delirium: CAM-ICU     E: Early Mobility Performed? Yes   F: Feeding Goal:    Status:     Current Diet  Order   Procedures    Diet NPO      AS: Analgesia/Sedation On hold   T: Thromboembolic Prophylaxis heparin   H: HOB > 300 Yes   U: Stress Ulcer Prophylaxis (if needed) pepcid   G: Glucose Control PRN   B: Bowel Function     I: Indwelling Catheter (Lines & Nieves) Necessity PICC, PIV, nieves   D: De-escalation of Antimicrobials/Pharmacotherapies none    Plan for the day/ETD Extubate, if appropriate    Code Status:  Family/Goals of Care: Full Code  recovery     Critical Care Time: 50 minutes  Critical secondary to Patient has a condition that poses threat to life and bodily function: Acute Myocardial Infarction, Severe Respiratory Distress and Acute Renal Failure     Critical care was time spent personally by me on the following activities: development of treatment plan with patient or surrogate and bedside caregivers, discussions with consultants, evaluation of patient's response to treatment, examination of patient, ordering and performing treatments and interventions, ordering and review of laboratory studies, ordering and review of radiographic studies, pulse oximetry, re-evaluation of patient's condition. This critical care time did not overlap with that of any other provider or involve time for any procedures.    Thank you for your consult. I will follow-up with patient. Please contact us if you have any additional questions.     Burt Purvis MD  Critical Care Medicine  [facility] - Intensive Care

## 2022-12-02 NOTE — HPI
This is a 61 year old male with a PMHx of hypertension, HLD who presented with cardiac arrest.     Patient presented to the ED complaining of chest pain. Shortly after arrival, he was noted to be snoring, urinated on himself and was thought to have a seizure. He was hooked up to the monitor and was noted to be in torsade. He later went into cardiac arrest and resuscitated per ACLS. Rhythm was ventricular fibirlation. He recieved epinephrine x 5, calcium chloride, amiodarone, magnseium sulfate x2, sodium bicarbonate x1 and was defibrillated x4. Initial EKG appeared to show ST elevations.  Case was reviewed with Cardiology, with initial plans to activate the cath lab, however, given improvement of ST elevations later, no intervention was performed. He was given aspirin, plavix, heparin and was started on amiodarone drip. A PICC line was ordered. Labs were remarkable for hypokalemia (2.9- hemolyzed specimen), elevated troponin (0.09), ABG showing a PH of 7.2, PCO2: 48, HCO3: 19 and an AG of 19 and an elevated lactic acid (6.1). The patient required escalation of his sedation due to agitation, however, was not purposefully following commands.He was admitted to the ICU for further management.

## 2022-12-02 NOTE — H&P
[facility] Medicine  History & Physical    Patient Name: Charles Kapoor  MRN: [id number]  Patient Class: IP- Inpatient  Admission Date: 12/1/2022  Attending Physician: Sergio Donald MD   Primary Care Provider: [facility]         Patient information was obtained from ER records.     Subjective:     Principal Problem:Cardiac arrest    Chief Complaint:   Chief Complaint   Patient presents with    Seizures     While patient in waiting room waiting for Quick Look noted to have snoring respirations and tonic clonic seizure activity, stretcher obtained and patient roomed to ED 17    Chest Pain        HPI: This is a 61 year old male with a PMHx of hypertension, HLD who presented with cardiac arrest.     Patient presented to the ED complaining of chest pain. Shortly after arrival, he was noted to be snoring, urinated on himself and was thought to have a seizure. He was hooked up to the monitor and was noted to be in torsade. He later went into cardiac arrest and resuscitated per ACLS. Rhythm was ventricular fibirlation. He recieved epinephrine x 5, calcium chloride, amiodarone, magnseium sulfate x2, sodium bicarbonate x1 and was defibrillated x4. Initial EKG appeared to show ST elevations.  Case was reviewed with Cardiology, with initial plans to activate the cath lab, however, given improvement of ST elevations later, no intervention was performed. He was given aspirin, plavix, heparin and was started on amiodarone drip. A PICC line was ordered. Labs were remarkable for hypokalemia (2.9- hemolyzed specimen), elevated troponin (0.09), ABG showing a PH of 7.2, PCO2: 48, HCO3: 19 and an AG of 19 and an elevated lactic acid (6.1). The patient required escalation of his sedation due to agitation, however, was not purposefully following commands.He was admitted to the ICU for further management.       Past Medical History:   Diagnosis Date    Anxiety     HTN (hypertension)      Hypercholesteremia     Pulmonary embolism        No past surgical history on file.    Review of patient's allergies indicates:  No Known Allergies    No current facility-administered medications on file prior to encounter.     Current Outpatient Medications on File Prior to Encounter   Medication Sig    amLODIPine (NORVASC) 10 MG tablet Take 1 tablet (10 mg total) by mouth once daily.    aspirin (ECOTRIN) 81 MG EC tablet Take 81 mg by mouth once daily.    atenoloL (TENORMIN) 25 MG tablet Take 2 tablets (50 mg total) by mouth once daily. for 7 days    HYDROcodone-acetaminophen (NORCO) 5-325 mg per tablet Take 1 tablet by mouth every 4 (four) hours as needed.    ibuprofen (ADVIL,MOTRIN) 800 MG tablet Take 1 tablet (800 mg total) by mouth every 6 (six) hours as needed for Pain.    pravastatin (PRAVACHOL) 20 MG tablet Take 1 tablet (20 mg total) by mouth once daily.     Family History    None       Tobacco Use    Smoking status: Former    Smokeless tobacco: Never   Substance and Sexual Activity    Alcohol use: No    Drug use: Never    Sexual activity: Yes     Review of Systems   Unable to perform ROS: Intubated   Objective:     Vital Signs (Most Recent):  Temp: 98 °F (36.7 °C) (12/02/22 0042)  Pulse: (!) 118 (12/01/22 2221)  Resp: (!) 34 (12/01/22 2221)  SpO2: 99 % (12/01/22 2221)   Vital Signs (24h Range):  Temp:  [98 °F (36.7 °C)] 98 °F (36.7 °C)  Pulse:  [118-230] 118  Resp:  [20-34] 34  SpO2:  [94 %-99 %] 99 %     Weight: 120.2 kg (265 lb)  Body mass index is 41.5 kg/m².    Physical Exam  Constitutional:       Comments: Intubated/sedated    HENT:      Head: Normocephalic and atraumatic.      Nose: Nose normal.      Mouth/Throat:      Mouth: Mucous membranes are moist.      Comments: ETT in place   Cardiovascular:      Rate and Rhythm: Tachycardia present.      Pulses: Normal pulses.      Heart sounds: No murmur heard.  Pulmonary:      Effort: Pulmonary effort is normal.      Breath sounds: Wheezing  "present.   Abdominal:      General: Abdomen is flat.      Palpations: Abdomen is soft.   Genitourinary:     Comments: Hematuria 2/2 traumatic nieves   Musculoskeletal:      Right lower leg: No edema.      Left lower leg: No edema.   Skin:     General: Skin is warm.      Capillary Refill: Capillary refill takes less than 2 seconds.   Neurological:      Comments: Intubated/sedated            Significant Labs: All pertinent labs within the past 24 hours have been reviewed.    Significant Imaging: I have reviewed all pertinent imaging results/findings within the past 24 hours.    Assessment/Plan:     * Cardiac arrest  Presented with chest pain   Had a "seizure like" event in the ED, noted to be snoring and urinated on himself  Initial rhythm was torsade and later went into vfib requiring CPR, defibrillation   EKG showed ST elevation, cardiology evaluated the patient with initial plans to activate the cath lab, however, later his ST elevations improved and an intervention was deferred at this time.   Agitated, fighting sedation post arrest   DDx: likely cardiac etiology; ACS or arrhythmias 2/2 electrolyte derangements (likely hypokalemia), PE, or seizure disorder (unlikely, as history is not convincing). Other differentials considered less likely; myocarditis/cardiomyopathy, sepsis, intracranial process.     Plan:   - Cardiology consult   - Trend troponin, telemetry monitoring   - Maintain ET tube, titrate vent to ABGs   - Will hold off on TTM given patients response after CPR.   - Replace electrolytes   - CT head without contrast, CTA chest   - Echocardiogram   - UDS  - PICC line, maintain MAPs > 65   - Will hold off on infectious work up at this time given that initial rhythm is more suggestive of a cardiac etiology       VTE Risk Mitigation (From admission, onward)         Ordered     heparin 25,000 units in dextrose 5% (100 units/ml) IV bolus from bag - ADDITIONAL PRN BOLUS - 60 units/kg (max bolus 4000 units)  As " needed (PRN)        Question:  Heparin Infusion Adjustment (DO NOT MODIFY ANSWER)  Answer:  \\[email or web address]\epic\Images\Pharmacy\HeparinInfusions\heparin LOW INTENSITY nomogram for [facility] [id number]    12/01/22 2302     heparin 25,000 units in dextrose 5% (100 units/ml) IV bolus from bag - ADDITIONAL PRN BOLUS - 30 units/kg (max bolus 4000 units)  As needed (PRN)        Question:  Heparin Infusion Adjustment (DO NOT MODIFY ANSWER)  Answer:  \\[email or web address]\epic\Images\Pharmacy\HeparinInfusions\heparin LOW INTENSITY nomogram for [facility] [id number].pdf    12/01/22 2302     IP VTE HIGH RISK PATIENT  Once         12/01/22 2332     Place sequential compression device  Until discontinued         12/01/22 2332     heparin 25,000 units in dextrose 5% 250 mL (100 units/mL) infusion LOW INTENSITY nomogram - [facility]  Continuous        Question Answer Comment   Heparin Infusion Adjustment (DO NOT MODIFY ANSWER) \\[email or web address]\epic\Images\Pharmacy\HeparinInfusions\heparin LOW INTENSITY nomogram for [facility] [id number].pdf    Begin at (in units/kg/hr) 12        12/01/22 2302              Critical care time spent on the evaluation and treatment of severe organ dysfunction, review of pertinent labs and imaging studies, discussions with consulting providers and discussions with patient/family: 45 minutes.      Cayetano Gray MD  Department of Hospital Medicine   [facility] - [place] - Emergency Dept

## 2022-12-03 PROBLEM — R79.89 ELEVATED LFTS: Status: ACTIVE | Noted: 2022-01-01

## 2022-12-03 PROBLEM — I21.4 NSTEMI (NON-ST ELEVATED MYOCARDIAL INFARCTION): Status: ACTIVE | Noted: 2022-01-01

## 2022-12-03 PROBLEM — R31.0 GROSS HEMATURIA: Status: RESOLVED | Noted: 2022-01-01 | Resolved: 2022-01-01

## 2022-12-03 NOTE — PROVIDER TRANSFER
Transfer Note    62 y/o male admitted with VF arrest with ROSC after ACLS.  Initially noted to be in torsade and eventually cardiac arrest.  Noted to be in v. Fib and underwent ACLS with epi x 5, calcium chloride, amiodarone bolus, magnesium sulfate x 2, sodium bicarbonate x 1 and was defibrillated x 4 (2 x 200 and 2 x 400 joules) with eventual ROSC.  Initial concern for STEMI and Cardiology consulted.  ST segments have come down with medical stabilization.  Hypokalemia replaced.  Troponin up to 32.  Started on Amio and Heparin drips.  ASA, Statin and Plavix.  EF of 55% on Echo.  Pulmonary consulted for vent management.  Adequate mentation and patient extubated on 12/2.  ARF, but adequate urine output.  Continue K replacement as needed.  Monitor renal function and LHC on Monday if Creat improved.  He apparently stopping taking all his medications recently for some reason.

## 2022-12-03 NOTE — ASSESSMENT & PLAN NOTE
VF arrest with ROSC after ACLS  CAHP score suggests good prognosis.  Initial concern for STEMI, ST segments have come down with medical stabilization  Admit pt to ICU on hospitalist svc  Cont IV amio/heparin  Cont ASA/Plavix/statin  Check echo  Cath pending clinical course and neurologic improvement    12/02/2022 - follow-up EKG does not show ST elevation.  Troponin has increased to 32.  Continue ASA/Plavix. Continue ASA. Echocardiogram showed ejection fraction of roughly 50%.  As long as hemodynamics maintained no need for emergent catheterization at this point.  Correct electrolytes.    12/03/2022-patient has been extubated.  At some point we plan on coronary angiography.  His creatinine has increased to 2.9.  He is hemodynamically stable.  Continue aspirin/Plavix.  Discontinue IV amiodarone.  Can start p.o. amiodarone at 400 mg p.o. q.d..  Stable for transfer to telemetry.

## 2022-12-03 NOTE — PLAN OF CARE
Patient tolerated extubation well yesterday.  O2 via NC is weaning as expected.  Continue follow up with cardiology.  Not on pressors.  No further pulm/cc needs at this time.  Please re-consult if any questions.    Burt Purvis MD  [facility]

## 2022-12-03 NOTE — ASSESSMENT & PLAN NOTE
"Presented with chest pain   Had a "seizure like" event in the ED, noted to be snoring and urinated on himself  Initial rhythm was torsade and later went into vfib requiring CPR, defibrillation   EKG showed ST elevation, cardiology evaluated the patient with initial plans to activate the cath lab, however, later his ST elevations improved and an intervention was deferred at this time.   Agitated, fighting sedation post arrest   DDx: likely cardiac etiology; ACS or arrhythmias 2/2 electrolyte derangements (likely hypokalemia).   Cardiology consulted.  Planning LHC once medically stable.  Appropriate mentation and extubated on 12/2.  "

## 2022-12-03 NOTE — NURSING
[facility] [place]  Nurses Note -- 4 Eyes      12/3/2022       Skin assessed on: Q Shift      [x] No Pressure Injuries Present    [x]Prevention Measures Documented    [] Yes LDA  for Pressure Injury Previously documented     [] Yes New Pressure Injury Discovered   [] LDA for New Pressure Injury Added      Attending RN:  Ada Alvarez RN     Second RN:  WHIT Hogan

## 2022-12-03 NOTE — CARE UPDATE
[facility] Intensive Care  ICU Shift Summary  Date: 12/2/2022      Prehospitalization: Home  Admit Date / LOS : 12/1/2022/ 1 days    Diagnosis: Cardiac arrest    Consults:        Active: Cardio, Pulm CC, and RD       Needed: N/A     Code Status: Full Code   Advanced Directive: <no information>    LDA:  Lines/Drains/Airways       Peripherally Inserted Central Catheter Line  Duration             PICC Triple Lumen 12/02/22 0233 right basilic <1 day              Drain  Duration                  Urethral Catheter 12/01/22 2310 Coude 16 Fr. <1 day              Peripheral Intravenous Line  Duration                  Peripheral IV - Single Lumen 12/01/22 2201 20 G Left Antecubital <1 day         Peripheral IV - Single Lumen 12/01/22 2215 20 G Right Antecubital <1 day                  Central Lines/Site/Justification:Multiple GTTS  Urinary Cath/Order/Justification:Critically Ill in the ICU and requiring intensive monitoring    Vasopressors/Infusions:    amiodarone in dextrose 5% 0.5 mg/min (12/02/22 1800)    heparin (porcine) in D5W 14 Units/kg/hr (12/02/22 1901)          GOALS: Volume/ Hemodynamic: N/A                     RASS: N/A    Pain Management: none       Pain Controlled: not applicable     Rhythm: NSR    Respiratory Device: Nasal Cannula    Vent Mode: NIV PS  Oxygen Concentration (%):  [] 40  Resp Rate Total:  [16 br/min-50 br/min] 16 br/min  Vt Set:  [420 mL-500 mL] 420 mL  PEEP/CPAP:  [5 cmH20] 5 cmH20  Pressure Support:  [5 cmH20] 5 cmH20  Mean Airway Pressure:  [4.6 fjL06-58.7 cmH20] 6.4 cmH20             Most Recent SBT/ SAT: Pass       MOVE Screen: FAIL  ICU Liberation: yes    VTE Prophylaxis: Mechanical  Mobility: Bedrest  Stress Ulcer Prophylaxis: Yes    Isolation: No active isolations    Dietary:   Current Diet Order   Procedures    Diet Dysphagia Mechanical Soft (IDDSI Level 5) [facility]; Cardiac (Low Na/Chol)     Order Specific Question:   Indicate patient location for additional diet  options:     Answer:   [facility]     Order Specific Question:   Additional Diet Options:     Answer:   Cardiac (Low Na/Chol)      Tolerance: yes  Advancement: yes    I & O (24h):    Intake/Output Summary (Last 24 hours) at 12/2/2022 1907  Last data filed at 12/2/2022 1800  Gross per 24 hour   Intake 2081.97 ml   Output 1030 ml   Net 1051.97 ml        Restraints: No    Significant Dates:  Post Op Date: N/A  Rescue Date: N/A  Imaging/ Diagnostics:  echo    Noteworthy Labs:  see below    COVID Test: (--)  CBC/Anemia Labs: Coags:    Recent Labs   Lab 12/02/22 0417 12/02/22  0606   WBC 11.14 10.61   HGB 13.0* 13.8*   HCT 39.4* 39.1*    179   MCV 90 88   RDW 12.7 12.6    Recent Labs   Lab 12/01/22  2342 12/02/22 0417 12/02/22  0606 12/02/22  1313   INR 1.0  --   --   --    APTT 51.7*   < > 35.9* 42.7*    < > = values in this interval not displayed.        Chemistries:   Recent Labs   Lab 12/01/22 2235 12/02/22 0417    140   K 2.9* 2.9*    105   CO2 18* 25   BUN 18 26*   CREATININE 1.1 1.9*   CALCIUM 9.2 8.6*   PROT 7.9  --    BILITOT 0.5  --    ALKPHOS 133  --    ALT 55*  --    AST 45*  --    MG 3.7* 3.3*   PHOS 3.6 2.3*        Cardiac Enzymes: Ejection Fractions:    Recent Labs     12/01/22 2235 12/02/22 0417   TROPONINI 0.090* 32.535*    EF   Date Value Ref Range Status   12/02/2022 50 % Final        POCT Glucose: HbA1c:    Recent Labs   Lab 12/02/22  0105   POCTGLUCOSE 178*    No results found for: HGBA1C        ICU LOS 15h  Level of Care: Critical Care    Chart Check: 12 HR Done  Shift Summary/Plan for the shift: sedation weaned; pt able to follow commands; pt extubated to BiPAP x1hr THEN 4L NC. Pt AAOx4. Family members visited and updated on plan of care. Still on heparin and amiodarone gtt with future cath lab before discharge. Pt remains free from injury. Vs and assessment per flow sheet. Pt remains in ICU.

## 2022-12-03 NOTE — HOSPITAL COURSE
60 y/o male admitted with VF arrest with ROSC after ACLS.  Initially noted to be in torsade and eventually cardiac arrest.  Noted to be in v. Fib and underwent ACLS with epi x 5, calcium chloride, amiodarone bolus, magnesium sulfate x 2, sodium bicarbonate x 1 and was defibrillated x 4 (2 x 200 and 2 x 400 joules) with eventual ROSC.  Initial concern for STEMI and Cardiology consulted.  ST segments have come down with medical stabilization.  Hypokalemia replaced.  Troponin up to 32.  Started on Amio and Heparin drips.  ASA, Statin and Plavix.  EF of 55% on Echo.  Pulmonary consulted for vent management.  Adequate mentation and patient extubated on 12/2.  ARF, but adequate urine output.  Unfortunately in my first encounter with patient on 12.5.22,[facility] was called duo to cardiac arrest,patient was started on CPR immediately,PEA on monitor,patient twice had ROSC,but again had cardiac arrest,was started on aggressive ACLS.ER and cardiology team was present,bed side echo. Show large post MI cardiac effusion,several bed side attempt with needle for pericardiocentesis was unsuccessful,ACLS was continued,but unfortunately patient remains in cardiac arrest and pulseless,,family was notified,time of death at 08:33

## 2022-12-03 NOTE — PROGRESS NOTES
[facility] Intensive Care  Cardiology  Progress Note    Patient Name: Charles Kapoor  MRN: [id number]  Admission Date: 12/1/2022  Hospital Length of Stay: 2 days  Code Status: Full Code   Attending Physician: Sergio Donald MD   Primary Care Physician: [facility]  Expected Discharge Date:   Principal Problem:Cardiac arrest    Subjective:     Hospital Course:   No notes on file    Interval History: Patient extubated    Review of Systems   Constitutional: Negative for diaphoresis and malaise/fatigue.   HENT:  Negative for congestion, hearing loss, hoarse voice, nosebleeds, odynophagia, stridor and tinnitus.    Eyes:  Negative for blurred vision, double vision, photophobia, visual disturbance and visual halos.   Cardiovascular:  Negative for chest pain, dyspnea on exertion, irregular heartbeat, leg swelling, near-syncope, orthopnea, palpitations, paroxysmal nocturnal dyspnea and syncope.   Respiratory:  Negative for shortness of breath, sputum production and wheezing.    Musculoskeletal:  Negative for falls, joint pain, muscle cramps, muscle weakness, myalgias, neck pain and stiffness.   Gastrointestinal:  Negative for abdominal pain, heartburn, hematemesis and melena.   Neurological:  Negative for disturbances in coordination, dizziness, focal weakness, headaches, light-headedness, loss of balance, numbness, paresthesias, seizures, sensory change, tremors, vertigo and weakness.   Psychiatric/Behavioral:  Negative for altered mental status, depression, hallucinations and memory loss. The patient does not have insomnia and is not nervous/anxious.    Objective:     Vital Signs (Most Recent):  Temp: 98.5 °F (36.9 °C) (12/03/22 0800)  Pulse: 71 (12/03/22 1000)  Resp: (!) 25 (12/03/22 1000)  BP: 120/68 (12/03/22 1000)  SpO2: 95 % (12/03/22 1000)   Vital Signs (24h Range):  Temp:  [97.8 °F (36.6 °C)-98.8 °F (37.1 °C)] 98.5 °F (36.9 °C)  Pulse:  [62-81] 71  Resp:  [13-30] 25  SpO2:  [90 %-96 %] 95 %  BP:  (105-141)/(58-82) 120/68     Weight: 111.1 kg (244 lb 14.9 oz)  Body mass index is 38.36 kg/m².     SpO2: 95 %  O2 Device (Oxygen Therapy): nasal cannula      Intake/Output Summary (Last 24 hours) at 12/3/2022 1057  Last data filed at 12/3/2022 1023  Gross per 24 hour   Intake 1611.57 ml   Output 1955 ml   Net -343.43 ml       Lines/Drains/Airways       Peripherally Inserted Central Catheter Line  Duration             PICC Triple Lumen 12/02/22 0233 right basilic 1 day              Drain  Duration                  Urethral Catheter 12/01/22 2310 Coude 16 Fr. 1 day              Peripheral Intravenous Line  Duration                  Peripheral IV - Single Lumen 12/01/22 2201 20 G Left Antecubital 1 day         Peripheral IV - Single Lumen 12/01/22 2215 20 G Right Antecubital 1 day                    Physical Exam    Significant Labs: CMP   Recent Labs   Lab 12/01/22  2235 12/02/22  0417 12/02/22  1800 12/03/22  0135 12/03/22  0852      < > 143 141 140   K 2.9*   < > 3.2* 3.0* 2.8*      < > 104 104 104   CO2 18*   < > 23 25 23   *   < > 154* 135* 174*   BUN 18   < > 35* 41* 42*   CREATININE 1.1   < > 2.7* 2.9* 2.9*   CALCIUM 9.2   < > 8.3* 8.3* 8.0*   PROT 7.9  --   --  6.4  --    ALBUMIN 3.8  --   --  3.5  --    BILITOT 0.5  --   --  0.7  --    ALKPHOS 133  --   --  101  --    AST 45*  --   --  472*  --    ALT 55*  --   --  146*  --    ANIONGAP 19*   < > 16 12 13    < > = values in this interval not displayed.   , CBC   Recent Labs   Lab 12/02/22  0417 12/02/22  0606 12/03/22  0614   WBC 11.14 10.61 11.98   HGB 13.0* 13.8* 12.5*   HCT 39.4* 39.1* 38.0*    179 147*   , Lipid Panel No results for input(s): CHOL, HDL, LDLCALC, TRIG, CHOLHDL in the last 48 hours., and Troponin   Recent Labs   Lab 12/01/22  2235 12/02/22  0417   TROPONINI 0.090* 32.535*       Significant Imaging: Echocardiogram: Transthoracic echo (TTE) complete (Cupid Only):   Results for orders placed or performed during the  hospital encounter of 12/01/22   Echo   Result Value Ref Range    BSA 2.29 m2    TDI SEPTAL 0.05 m/s    LV LATERAL E/E' RATIO 10.50 m/s    LV SEPTAL E/E' RATIO 12.60 m/s    LA WIDTH 4.10 cm    IVC diameter 2.74 cm    Left Ventricular Outflow Tract Mean Velocity 0.79 cm/s    Left Ventricular Outflow Tract Mean Gradient 2.82 mmHg    AORTIC VALVE CUSP SEPERATION 2.25 cm    TDI LATERAL 0.06 m/s    PV PEAK VELOCITY 0.85 cm/s    LVIDd 4.98 3.5 - 6.0 cm    IVS 1.51 (A) 0.6 - 1.1 cm    Posterior Wall 1.38 (A) 0.6 - 1.1 cm    Ao root annulus 2.94 cm    LVIDs 3.83 2.1 - 4.0 cm    FS 23 28 - 44 %    LA volume 68.12 cm3    Sinus 3.06 cm    Ascending aorta 3.49 cm    LV mass 303.40 g    LA size 3.80 cm    TAPSE 2.02 cm    Left Ventricle Relative Wall Thickness 0.55 cm    AV mean gradient 4 mmHg    AV valve area 2.81 cm2    AV Velocity Ratio 0.84     AV index (prosthetic) 0.86     MV valve area p 1/2 method 2.10 cm2    E/A ratio 0.81     Mean e' 0.06 m/s    E wave deceleration time 198.60 msec    LVOT diameter 2.04 cm    LVOT area 3.3 cm2    LVOT peak jorge l 1.08 m/s    LVOT peak VTI 21.10 cm    Ao peak jorge l 1.28 m/s    Ao VTI 24.5 cm    LVOT stroke volume 68.93 cm3    AV peak gradient 7 mmHg    E/E' ratio 11.45 m/s    MV Peak E Jorge L 0.63 m/s    TR Max Jorge L 1.70 m/s    MV stenosis pressure 1/2 time 105.01 ms    MV Peak A Jorge L 0.78 m/s    LV Systolic Volume 62.99 mL    LV Systolic Volume Index 28.6 mL/m2    LV Diastolic Volume 117.11 mL    LV Diastolic Volume Index 53.23 mL/m2    LA Volume Index 31.0 mL/m2    LV Mass Index 138 g/m2    RA Major Axis 5.16 cm    Left Atrium Minor Axis 4.10 cm    Left Atrium Major Axis 6.90 cm    Triscuspid Valve Regurgitation Peak Gradient 12 mmHg    RA Width 3.74 cm    EF 50 %    Narrative    · Technically difficult study.  · The left ventricle is normal in size with mild concentric hypertrophy   and low normal systolic function.  · The estimated ejection fraction is 50-55%.  · Normal left ventricular  diastolic function.  · Normal right ventricular size with normal right ventricular systolic   function.  · There is no pulmonary hypertension.  · Mechanically ventilated; cannot use inferior caval vein diameter to   estimate central venous pressure.        Assessment and Plan:     Brief HPI:     * Cardiac arrest  VF arrest with ROSC after ACLS  CAHP score suggests good prognosis.  Initial concern for STEMI, ST segments have come down with medical stabilization  Admit pt to ICU on hospitalist svc  Cont IV amio/heparin  Cont ASA/Plavix/statin  Check echo  Cath pending clinical course and neurologic improvement    12/02/2022 - follow-up EKG does not show ST elevation.  Troponin has increased to 32.  Continue ASA/Plavix. Continue ASA. Echocardiogram showed ejection fraction of roughly 50%.  As long as hemodynamics maintained no need for emergent catheterization at this point.  Correct electrolytes.    12/03/2022-patient has been extubated.  At some point we plan on coronary angiography.  His creatinine has increased to 2.9.  He is hemodynamically stable.  Continue aspirin/Plavix.  Discontinue IV amiodarone.  Can start p.o. amiodarone at 400 mg p.o. q.d..  Stable for transfer to telemetry.    PRAKASH (acute kidney injury)  12/02/2022-in setting of cardiac arrest.  Hypotension.  Continue pressors.  IV fluids.  Monitor.    12/03/2022-creatinine 2.9 today.  Likely ATN. Nephrology consult.        VTE Risk Mitigation (From admission, onward)         Ordered     heparin 25,000 units in dextrose 5% (100 units/ml) IV bolus from bag - ADDITIONAL PRN BOLUS - 60 units/kg (max bolus 4000 units)  As needed (PRN)        Question:  Heparin Infusion Adjustment (DO NOT MODIFY ANSWER)  Answer:  [email or web address]\epic\Images\Pharmacy\HeparinInfusions\heparin LOW INTENSITY nomogram for [facility] [id number]    12/01/22 2302     heparin 25,000 units in dextrose 5% (100 units/ml) IV bolus from bag - ADDITIONAL PRN BOLUS - 30 units/kg (max bolus 4000 units)   As needed (PRN)        Question:  Heparin Infusion Adjustment (DO NOT MODIFY ANSWER)  Answer:  [email or web address]\epic\Images\Pharmacy\HeparinInfusions\heparin LOW INTENSITY nomogram for [facility] [id number]    12/01/22 2302     IP VTE HIGH RISK PATIENT  Once         12/01/22 2332     Place sequential compression device  Until discontinued         12/01/22 2332     heparin 25,000 units in dextrose 5% 250 mL (100 units/mL) infusion LOW INTENSITY nomogram - [facility]  Continuous        Question Answer Comment   Heparin Infusion Adjustment (DO NOT MODIFY ANSWER) [email or web address]\epic\Images\Pharmacy\HeparinInfusions\heparin LOW INTENSITY nomogram for [facility] [id number]    Begin at (in units/kg/hr) 12 12/01/22 2302              Critical Care Time: 30 minutes     Critical care was time spent personally by me on the following activities: development of treatment plan with patient or surrogate and bedside caregivers, discussions with consultants, evaluation of patient's response to treatment, examination of patient, ordering and performing treatments and interventions, ordering and review of laboratory studies, ordering and review of radiographic studies, pulse oximetry, re-evaluation of patient's condition. This critical care time did not overlap with that of any other provider or involve time for any procedures.    Sherman Hussein MD  Cardiology  [facility] - Intensive Care

## 2022-12-03 NOTE — SUBJECTIVE & OBJECTIVE
Interval History: doing well with no new complaints.    Review of Systems   HENT:  Negative for ear discharge and ear pain.    Eyes:  Negative for discharge and itching.   Endocrine: Negative for cold intolerance and heat intolerance.   Neurological:  Negative for seizures and syncope.   Objective:     Vital Signs (Most Recent):  Temp: 98.5 °F (36.9 °C) (12/03/22 0800)  Pulse: 71 (12/03/22 1000)  Resp: (!) 25 (12/03/22 1000)  BP: 120/68 (12/03/22 1000)  SpO2: 95 % (12/03/22 1000)   Vital Signs (24h Range):  Temp:  [97.8 °F (36.6 °C)-98.8 °F (37.1 °C)] 98.5 °F (36.9 °C)  Pulse:  [62-81] 71  Resp:  [13-30] 25  SpO2:  [90 %-96 %] 95 %  BP: (105-141)/(58-82) 120/68     Weight: 111.1 kg (244 lb 14.9 oz)  Body mass index is 38.36 kg/m².    Intake/Output Summary (Last 24 hours) at 12/3/2022 1055  Last data filed at 12/3/2022 1023  Gross per 24 hour   Intake 1611.57 ml   Output 1955 ml   Net -343.43 ml      Physical Exam  Constitutional:       General: He is not in acute distress.     Appearance: He is obese. He is not ill-appearing, toxic-appearing or diaphoretic.   HENT:      Head: Normocephalic and atraumatic.      Nose: Nose normal.      Mouth/Throat:      Mouth: Mucous membranes are moist.   Eyes:      General: No scleral icterus.     Extraocular Movements: Extraocular movements intact.      Pupils: Pupils are equal, round, and reactive to light.   Cardiovascular:      Rate and Rhythm: Normal rate and regular rhythm.      Heart sounds: No murmur heard.    No friction rub. No gallop.   Pulmonary:      Effort: No respiratory distress.      Breath sounds: No wheezing.   Abdominal:      General: Abdomen is flat. Bowel sounds are normal. There is no distension.      Palpations: Abdomen is soft.      Tenderness: There is no abdominal tenderness.   Musculoskeletal:         General: Normal range of motion.      Cervical back: Normal range of motion.      Right lower leg: No edema.      Left lower leg: No edema.   Skin:      General: Skin is warm.   Neurological:      General: No focal deficit present.      Mental Status: He is alert.       Significant Labs: All pertinent labs within the past 24 hours have been reviewed.  BMP:   Recent Labs   Lab 12/03/22  0135 12/03/22  0852   * 174*    140   K 3.0* 2.8*    104   CO2 25 23   BUN 41* 42*   CREATININE 2.9* 2.9*   CALCIUM 8.3* 8.0*   MG 2.7*  --      CBC:   Recent Labs   Lab 12/02/22  0417 12/02/22  0606 12/03/22  0614   WBC 11.14 10.61 11.98   HGB 13.0* 13.8* 12.5*   HCT 39.4* 39.1* 38.0*    179 147*       Significant Imaging: I have reviewed all pertinent imaging results/findings within the past 24 hours.

## 2022-12-03 NOTE — ASSESSMENT & PLAN NOTE
Patient with acute kidney injury likely due to acute tubular necrosis PRAKASH is currently worsening. Labs reviewed- Renal function/electrolytes with Estimated Creatinine Clearance: 32.6 mL/min (A) (based on SCr of 2.9 mg/dL (H)). according to latest data. Monitor urine output and serial BMP and adjust therapy as needed. Avoid nephrotoxins and renally dose meds for GFR listed above.   Increasing Creat, but adequate urine output.  Creat should peak soon and start improving.

## 2022-12-03 NOTE — PLAN OF CARE
"No distress throughout the night c/o of mild chest soreness stated if feel like his " chest bone is hurting c/o aching 4/10. Pt defibrillated x4 and continuous chest compressions noted on 12/1/2022. Tylenol given for pain.  "

## 2022-12-03 NOTE — PROGRESS NOTES
[facility]  Progress Note    Patient Name: Charles Kapoor  MRN: [id number]  Patient Class: IP- Inpatient   Admission Date: 12/1/2022  Length of Stay: 2 days  Attending Physician: Sergio Donald MD  Primary Care Provider: [facility]        Subjective:     Principal Problem:Cardiac arrest        HPI:  This is a 61 year old male with a PMHx of hypertension, HLD who presented with cardiac arrest.     Patient presented to the ED complaining of chest pain. Shortly after arrival, he was noted to be snoring, urinated on himself and was thought to have a seizure. He was hooked up to the monitor and was noted to be in torsade. He later went into cardiac arrest and resuscitated per ACLS. Rhythm was ventricular fibirlation. He recieved epinephrine x 5, calcium chloride, amiodarone, magnseium sulfate x2, sodium bicarbonate x1 and was defibrillated x4. Initial EKG appeared to show ST elevations.  Case was reviewed with Cardiology, with initial plans to activate the cath lab, however, given improvement of ST elevations later, no intervention was performed. He was given aspirin, plavix, heparin and was started on amiodarone drip. A PICC line was ordered. Labs were remarkable for hypokalemia (2.9- hemolyzed specimen), elevated troponin (0.09), ABG showing a PH of 7.2, PCO2: 48, HCO3: 19 and an AG of 19 and an elevated lactic acid (6.1). The patient required escalation of his sedation due to agitation, however, was not purposefully following commands.He was admitted to the ICU for further management.       Overview/Hospital Course:  60 y/o male admitted with VF arrest with ROSC after ACLS.  Initially noted to be in torsade and eventually cardiac arrest.  Noted to be in v. Fib and underwent ACLS with epi x 5, calcium chloride, amiodarone bolus, magnesium sulfate x 2, sodium bicarbonate x 1 and was defibrillated x 4 (2 x 200 and 2 x 400 joules) with eventual ROSC.  Initial concern  for STEMI and Cardiology consulted.  ST segments have come down with medical stabilization.  Hypokalemia replaced.  Troponin up to 32.  Started on Amio and Heparin drips.  ASA, Statin and Plavix.  EF of 55% on Echo.  Pulmonary consulted for vent management.  Adequate mentation and patient extubated on 12/2.  ARF, but adequate urine output.      Interval History: doing well with no new complaints.    Review of Systems   HENT:  Negative for ear discharge and ear pain.    Eyes:  Negative for discharge and itching.   Endocrine: Negative for cold intolerance and heat intolerance.   Neurological:  Negative for seizures and syncope.   Objective:     Vital Signs (Most Recent):  Temp: 98.5 °F (36.9 °C) (12/03/22 0800)  Pulse: 71 (12/03/22 1000)  Resp: (!) 25 (12/03/22 1000)  BP: 120/68 (12/03/22 1000)  SpO2: 95 % (12/03/22 1000)   Vital Signs (24h Range):  Temp:  [97.8 °F (36.6 °C)-98.8 °F (37.1 °C)] 98.5 °F (36.9 °C)  Pulse:  [62-81] 71  Resp:  [13-30] 25  SpO2:  [90 %-96 %] 95 %  BP: (105-141)/(58-82) 120/68     Weight: 111.1 kg (244 lb 14.9 oz)  Body mass index is 38.36 kg/m².    Intake/Output Summary (Last 24 hours) at 12/3/2022 1055  Last data filed at 12/3/2022 1023  Gross per 24 hour   Intake 1611.57 ml   Output 1955 ml   Net -343.43 ml      Physical Exam  Constitutional:       General: He is not in acute distress.     Appearance: He is obese. He is not ill-appearing, toxic-appearing or diaphoretic.   HENT:      Head: Normocephalic and atraumatic.      Nose: Nose normal.      Mouth/Throat:      Mouth: Mucous membranes are moist.   Eyes:      General: No scleral icterus.     Extraocular Movements: Extraocular movements intact.      Pupils: Pupils are equal, round, and reactive to light.   Cardiovascular:      Rate and Rhythm: Normal rate and regular rhythm.      Heart sounds: No murmur heard.    No friction rub. No gallop.   Pulmonary:      Effort: No respiratory distress.      Breath sounds: No wheezing.   Abdominal:  "     General: Abdomen is flat. Bowel sounds are normal. There is no distension.      Palpations: Abdomen is soft.      Tenderness: There is no abdominal tenderness.   Musculoskeletal:         General: Normal range of motion.      Cervical back: Normal range of motion.      Right lower leg: No edema.      Left lower leg: No edema.   Skin:     General: Skin is warm.   Neurological:      General: No focal deficit present.      Mental Status: He is alert.       Significant Labs: All pertinent labs within the past 24 hours have been reviewed.  BMP:   Recent Labs   Lab 12/03/22  0135 12/03/22  0852   * 174*    140   K 3.0* 2.8*    104   CO2 25 23   BUN 41* 42*   CREATININE 2.9* 2.9*   CALCIUM 8.3* 8.0*   MG 2.7*  --      CBC:   Recent Labs   Lab 12/02/22  0417 12/02/22  0606 12/03/22  0614   WBC 11.14 10.61 11.98   HGB 13.0* 13.8* 12.5*   HCT 39.4* 39.1* 38.0*    179 147*       Significant Imaging: I have reviewed all pertinent imaging results/findings within the past 24 hours.      Assessment/Plan:      * Cardiac arrest  Presented with chest pain   Had a "seizure like" event in the ED, noted to be snoring and urinated on himself  Initial rhythm was torsade and later went into vfib requiring CPR, defibrillation   EKG showed ST elevation, cardiology evaluated the patient with initial plans to activate the cath lab, however, later his ST elevations improved and an intervention was deferred at this time.   Agitated, fighting sedation post arrest   DDx: likely cardiac etiology; ACS or arrhythmias 2/2 electrolyte derangements (likely hypokalemia).   Cardiology consulted.  Planning LHC once medically stable.  Appropriate mentation and extubated on 12/2.    NSTEMI (non-ST elevated myocardial infarction)  LHC on Monday if renal function ok  Continue ASA, Statin, Plavix and Heparin drip.      Elevated LFTs  Possibly slight shock liver post arrest.  Continue to monitor.      Lactic acidosis  Patient has " cardiac arrest.  Improving.  Hemodynamically stable.      Hypophosphatemia  Replace as necessary.    Hypokalemia  Replace as necessary.      Acute hypoxemic respiratory failure  Intubated with cardiac arrest.  Adequate oxygenation.  Adequate mentation and extubated on 12/2  Currently doing well on NC    PRAKASH (acute kidney injury)  Patient with acute kidney injury likely due to acute tubular necrosis PRAKASH is currently worsening. Labs reviewed- Renal function/electrolytes with Estimated Creatinine Clearance: 32.6 mL/min (A) (based on SCr of 2.9 mg/dL (H)). according to latest data. Monitor urine output and serial BMP and adjust therapy as needed. Avoid nephrotoxins and renally dose meds for GFR listed above.   Increasing Creat, but adequate urine output.  Creat should peak soon and start improving.      VTE Risk Mitigation (From admission, onward)         Ordered     heparin 25,000 units in dextrose 5% (100 units/ml) IV bolus from bag - ADDITIONAL PRN BOLUS - 60 units/kg (max bolus 4000 units)  As needed (PRN)        Question:  Heparin Infusion Adjustment (DO NOT MODIFY ANSWER)  Answer:  [email or web address]\Images\Pharmacy\HeparinInfusions\heparin LOW INTENSITY nomogram for [facility] [id number].pdf    12/01/22 2302     heparin 25,000 units in dextrose 5% (100 units/ml) IV bolus from bag - ADDITIONAL PRN BOLUS - 30 units/kg (max bolus 4000 units)  As needed (PRN)        Question:  Heparin Infusion Adjustment (DO NOT MODIFY ANSWER)  Answer:  \\[email or web address]\Images\Pharmacy\HeparinInfusions\heparin LOW INTENSITY nomogram for [facility] [id number].pdf    12/01/22 2302     IP VTE HIGH RISK PATIENT  Once         12/01/22 2332     Place sequential compression device  Until discontinued         12/01/22 2332     heparin 25,000 units in dextrose 5% 250 mL (100 units/mL) infusion LOW INTENSITY nomogram - [facility]  Continuous        Question Answer Comment   Heparin Infusion Adjustment (DO NOT MODIFY ANSWER)  [email or web address]\epic\Images\Pharmacy\HeparinInfusions\heparin LOW INTENSITY nomogram for [facility] [id number].pdf    Begin at (in units/kg/hr) 12        12/01/22 2302                Discharge Planning   UMBERTO:      Code Status: Full Code   Is the patient medically ready for discharge?:     Reason for patient still in hospital (select all that apply): Treatment  Discharge Plan A:  (tbd)          Sergio Donald MD  Department of Hospital Medicine   [facility] - Intensive Care

## 2022-12-03 NOTE — ASSESSMENT & PLAN NOTE
12/02/2022-in setting of cardiac arrest.  Hypotension.  Continue pressors.  IV fluids.  Monitor.    12/03/2022-creatinine 2.9 today.  Likely ATN. Nephrology consult.

## 2022-12-03 NOTE — SUBJECTIVE & OBJECTIVE
Interval History: Patient extubated    Review of Systems   Constitutional: Negative for diaphoresis and malaise/fatigue.   HENT:  Negative for congestion, hearing loss, hoarse voice, nosebleeds, odynophagia, stridor and tinnitus.    Eyes:  Negative for blurred vision, double vision, photophobia, visual disturbance and visual halos.   Cardiovascular:  Negative for chest pain, dyspnea on exertion, irregular heartbeat, leg swelling, near-syncope, orthopnea, palpitations, paroxysmal nocturnal dyspnea and syncope.   Respiratory:  Negative for shortness of breath, sputum production and wheezing.    Musculoskeletal:  Negative for falls, joint pain, muscle cramps, muscle weakness, myalgias, neck pain and stiffness.   Gastrointestinal:  Negative for abdominal pain, heartburn, hematemesis and melena.   Neurological:  Negative for disturbances in coordination, dizziness, focal weakness, headaches, light-headedness, loss of balance, numbness, paresthesias, seizures, sensory change, tremors, vertigo and weakness.   Psychiatric/Behavioral:  Negative for altered mental status, depression, hallucinations and memory loss. The patient does not have insomnia and is not nervous/anxious.    Objective:     Vital Signs (Most Recent):  Temp: 98.5 °F (36.9 °C) (12/03/22 0800)  Pulse: 71 (12/03/22 1000)  Resp: (!) 25 (12/03/22 1000)  BP: 120/68 (12/03/22 1000)  SpO2: 95 % (12/03/22 1000)   Vital Signs (24h Range):  Temp:  [97.8 °F (36.6 °C)-98.8 °F (37.1 °C)] 98.5 °F (36.9 °C)  Pulse:  [62-81] 71  Resp:  [13-30] 25  SpO2:  [90 %-96 %] 95 %  BP: (105-141)/(58-82) 120/68     Weight: 111.1 kg (244 lb 14.9 oz)  Body mass index is 38.36 kg/m².     SpO2: 95 %  O2 Device (Oxygen Therapy): nasal cannula      Intake/Output Summary (Last 24 hours) at 12/3/2022 1057  Last data filed at 12/3/2022 1023  Gross per 24 hour   Intake 1611.57 ml   Output 1955 ml   Net -343.43 ml       Lines/Drains/Airways       Peripherally Inserted Central Catheter Line   Duration             PICC Triple Lumen 12/02/22 0233 right basilic 1 day              Drain  Duration                  Urethral Catheter 12/01/22 2310 Coude 16 Fr. 1 day              Peripheral Intravenous Line  Duration                  Peripheral IV - Single Lumen 12/01/22 2201 20 G Left Antecubital 1 day         Peripheral IV - Single Lumen 12/01/22 2215 20 G Right Antecubital 1 day                    Physical Exam    Significant Labs: CMP   Recent Labs   Lab 12/01/22 2235 12/02/22 0417 12/02/22  1800 12/03/22  0135 12/03/22  0852      < > 143 141 140   K 2.9*   < > 3.2* 3.0* 2.8*      < > 104 104 104   CO2 18*   < > 23 25 23   *   < > 154* 135* 174*   BUN 18   < > 35* 41* 42*   CREATININE 1.1   < > 2.7* 2.9* 2.9*   CALCIUM 9.2   < > 8.3* 8.3* 8.0*   PROT 7.9  --   --  6.4  --    ALBUMIN 3.8  --   --  3.5  --    BILITOT 0.5  --   --  0.7  --    ALKPHOS 133  --   --  101  --    AST 45*  --   --  472*  --    ALT 55*  --   --  146*  --    ANIONGAP 19*   < > 16 12 13    < > = values in this interval not displayed.   , CBC   Recent Labs   Lab 12/02/22 0417 12/02/22  0606 12/03/22  0614   WBC 11.14 10.61 11.98   HGB 13.0* 13.8* 12.5*   HCT 39.4* 39.1* 38.0*    179 147*   , Lipid Panel No results for input(s): CHOL, HDL, LDLCALC, TRIG, CHOLHDL in the last 48 hours., and Troponin   Recent Labs   Lab 12/01/22 2235 12/02/22 0417   TROPONINI 0.090* 32.535*       Significant Imaging: Echocardiogram: Transthoracic echo (TTE) complete (Cupid Only):   Results for orders placed or performed during the hospital encounter of 12/01/22   Echo   Result Value Ref Range    BSA 2.29 m2    TDI SEPTAL 0.05 m/s    LV LATERAL E/E' RATIO 10.50 m/s    LV SEPTAL E/E' RATIO 12.60 m/s    LA WIDTH 4.10 cm    IVC diameter 2.74 cm    Left Ventricular Outflow Tract Mean Velocity 0.79 cm/s    Left Ventricular Outflow Tract Mean Gradient 2.82 mmHg    AORTIC VALVE CUSP SEPERATION 2.25 cm    TDI LATERAL 0.06 m/s    PV PEAK  VELOCITY 0.85 cm/s    LVIDd 4.98 3.5 - 6.0 cm    IVS 1.51 (A) 0.6 - 1.1 cm    Posterior Wall 1.38 (A) 0.6 - 1.1 cm    Ao root annulus 2.94 cm    LVIDs 3.83 2.1 - 4.0 cm    FS 23 28 - 44 %    LA volume 68.12 cm3    Sinus 3.06 cm    Ascending aorta 3.49 cm    LV mass 303.40 g    LA size 3.80 cm    TAPSE 2.02 cm    Left Ventricle Relative Wall Thickness 0.55 cm    AV mean gradient 4 mmHg    AV valve area 2.81 cm2    AV Velocity Ratio 0.84     AV index (prosthetic) 0.86     MV valve area p 1/2 method 2.10 cm2    E/A ratio 0.81     Mean e' 0.06 m/s    E wave deceleration time 198.60 msec    LVOT diameter 2.04 cm    LVOT area 3.3 cm2    LVOT peak jorge l 1.08 m/s    LVOT peak VTI 21.10 cm    Ao peak jorge l 1.28 m/s    Ao VTI 24.5 cm    LVOT stroke volume 68.93 cm3    AV peak gradient 7 mmHg    E/E' ratio 11.45 m/s    MV Peak E Jorge L 0.63 m/s    TR Max Jorge L 1.70 m/s    MV stenosis pressure 1/2 time 105.01 ms    MV Peak A Jorge L 0.78 m/s    LV Systolic Volume 62.99 mL    LV Systolic Volume Index 28.6 mL/m2    LV Diastolic Volume 117.11 mL    LV Diastolic Volume Index 53.23 mL/m2    LA Volume Index 31.0 mL/m2    LV Mass Index 138 g/m2    RA Major Axis 5.16 cm    Left Atrium Minor Axis 4.10 cm    Left Atrium Major Axis 6.90 cm    Triscuspid Valve Regurgitation Peak Gradient 12 mmHg    RA Width 3.74 cm    EF 50 %    Narrative    · Technically difficult study.  · The left ventricle is normal in size with mild concentric hypertrophy   and low normal systolic function.  · The estimated ejection fraction is 50-55%.  · Normal left ventricular diastolic function.  · Normal right ventricular size with normal right ventricular systolic   function.  · There is no pulmonary hypertension.  · Mechanically ventilated; cannot use inferior caval vein diameter to   estimate central venous pressure.

## 2022-12-03 NOTE — NURSING
[facility] - [place]  Nurses Note -- 4 Eyes      12/2/2022       Skin assessed on: Q Shift      [x] No Pressure Injuries Present    [x]Prevention Measures Documented    [] Yes LDA  for Pressure Injury Previously documented     [] Yes New Pressure Injury Discovered   [] LDA for New Pressure Injury Added      Attending RN:  Ada Alvarez RN     Second RN:  WHIT José

## 2022-12-03 NOTE — ASSESSMENT & PLAN NOTE
Intubated with cardiac arrest.  Adequate oxygenation.  Adequate mentation and extubated on 12/2  Currently doing well on NC   no

## 2022-12-04 NOTE — PROGRESS NOTES
[place] - Telemetry  Cardiology  Progress Note    Patient Name: Charles Kapoor  MRN: [id number]  Admission Date: 12/1/2022  Hospital Length of Stay: 3 days  Code Status: Full Code   Attending Physician: García Mark III, MD   Primary Care Physician: [facility]  Expected Discharge Date:   Principal Problem:Cardiac arrest    Subjective:     Hospital Course:   No notes on file    Interval History: No events overnight    Review of Systems   Constitutional: Negative for diaphoresis and malaise/fatigue.   HENT:  Negative for congestion, hearing loss, hoarse voice, nosebleeds, odynophagia, stridor and tinnitus.    Eyes:  Negative for blurred vision, double vision, photophobia, visual disturbance and visual halos.   Cardiovascular:  Negative for chest pain, dyspnea on exertion, irregular heartbeat, leg swelling, near-syncope, orthopnea, palpitations, paroxysmal nocturnal dyspnea and syncope.   Respiratory:  Negative for shortness of breath, sputum production and wheezing.    Musculoskeletal:  Negative for falls, joint pain, muscle cramps, muscle weakness, myalgias, neck pain and stiffness.   Gastrointestinal:  Negative for abdominal pain, heartburn, hematemesis and melena.   Neurological:  Negative for disturbances in coordination, dizziness, focal weakness, headaches, light-headedness, loss of balance, numbness, paresthesias, seizures, sensory change, tremors, vertigo and weakness.   Psychiatric/Behavioral:  Negative for altered mental status, depression, hallucinations and memory loss. The patient does not have insomnia and is not nervous/anxious.    Objective:     Vital Signs (Most Recent):  Temp: 99 °F (37.2 °C) (12/04/22 1111)  Pulse: 70 (12/04/22 1111)  Resp: 16 (12/04/22 1111)  BP: 138/78 (12/04/22 1111)  SpO2: (!) 93 % (12/04/22 1111)   Vital Signs (24h Range):  Temp:  [98.2 °F (36.8 °C)-100.7 °F (38.2 °C)] 99 °F (37.2 °C)  Pulse:  [61-72] 70  Resp:  [15-24] 16  SpO2:  [91 %-97 %] 93 %  BP:  (111-148)/(62-87) 138/78     Weight: 111.1 kg (244 lb 14.9 oz)  Body mass index is 38.36 kg/m².     SpO2: (!) 93 %  O2 Device (Oxygen Therapy): nasal cannula      Intake/Output Summary (Last 24 hours) at 12/4/2022 1218  Last data filed at 12/4/2022 0935  Gross per 24 hour   Intake 2037.12 ml   Output 2125 ml   Net -87.88 ml       Lines/Drains/Airways       Peripherally Inserted Central Catheter Line  Duration             PICC Triple Lumen 12/02/22 0233 right basilic 2 days              Peripheral Intravenous Line  Duration                  Peripheral IV - Single Lumen 12/01/22 2215 20 G Right Antecubital 2 days                    Physical Exam  Vitals reviewed.   Constitutional:       General: He is not in acute distress.     Appearance: He is obese. He is not diaphoretic.   Neck:      Vascular: No carotid bruit or JVD.   Cardiovascular:      Rate and Rhythm: Normal rate and regular rhythm.      Pulses: Normal pulses.   Pulmonary:      Effort: Pulmonary effort is normal.      Breath sounds: Normal breath sounds.   Abdominal:      General: Bowel sounds are normal.      Palpations: Abdomen is soft.      Tenderness: There is no abdominal tenderness.   Musculoskeletal:      Right lower leg: No edema.      Left lower leg: No edema.   Neurological:      Mental Status: He is alert and oriented to person, place, and time.   Psychiatric:         Speech: Speech normal.         Behavior: Behavior normal.       Significant Labs: CMP   Recent Labs   Lab 12/03/22  0135 12/03/22  0852 12/04/22  0208    140 140   K 3.0* 2.8* 3.0*    104 104   CO2 25 23 25   * 174* 113*   BUN 41* 42* 37*   CREATININE 2.9* 2.9* 2.4*   CALCIUM 8.3* 8.0* 8.3*   PROT 6.4  --  6.4   ALBUMIN 3.5  --  3.3*   BILITOT 0.7  --  1.3*   ALKPHOS 101  --  82   *  --  176*   *  --  112*   ANIONGAP 12 13 11   , CBC   Recent Labs   Lab 12/03/22  0614 12/04/22  0541   WBC 11.98 10.17   HGB 12.5* 12.6*   HCT 38.0* 36.6*   * 145*    , Lipid Panel No results for input(s): CHOL, HDL, LDLCALC, TRIG, CHOLHDL in the last 48 hours., and Troponin No results for input(s): TROPONINI in the last 48 hours.    Significant Imaging: Echocardiogram: Transthoracic echo (TTE) complete ([facility] Only):   Results for orders placed or performed during the hospital encounter of 12/01/22   Echo   Result Value Ref Range    BSA 2.29 m2    TDI SEPTAL 0.05 m/s    LV LATERAL E/E' RATIO 10.50 m/s    LV SEPTAL E/E' RATIO 12.60 m/s    LA WIDTH 4.10 cm    IVC diameter 2.74 cm    Left Ventricular Outflow Tract Mean Velocity 0.79 cm/s    Left Ventricular Outflow Tract Mean Gradient 2.82 mmHg    AORTIC VALVE CUSP SEPERATION 2.25 cm    TDI LATERAL 0.06 m/s    PV PEAK VELOCITY 0.85 cm/s    LVIDd 4.98 3.5 - 6.0 cm    IVS 1.51 (A) 0.6 - 1.1 cm    Posterior Wall 1.38 (A) 0.6 - 1.1 cm    Ao root annulus 2.94 cm    LVIDs 3.83 2.1 - 4.0 cm    FS 23 28 - 44 %    LA volume 68.12 cm3    Sinus 3.06 cm    Ascending aorta 3.49 cm    LV mass 303.40 g    LA size 3.80 cm    TAPSE 2.02 cm    Left Ventricle Relative Wall Thickness 0.55 cm    AV mean gradient 4 mmHg    AV valve area 2.81 cm2    AV Velocity Ratio 0.84     AV index (prosthetic) 0.86     MV valve area p 1/2 method 2.10 cm2    E/A ratio 0.81     Mean e' 0.06 m/s    E wave deceleration time 198.60 msec    LVOT diameter 2.04 cm    LVOT area 3.3 cm2    LVOT peak jorge l 1.08 m/s    LVOT peak VTI 21.10 cm    Ao peak jorge l 1.28 m/s    Ao VTI 24.5 cm    LVOT stroke volume 68.93 cm3    AV peak gradient 7 mmHg    E/E' ratio 11.45 m/s    MV Peak E Jorge L 0.63 m/s    TR Max Jorge L 1.70 m/s    MV stenosis pressure 1/2 time 105.01 ms    MV Peak A Jorge L 0.78 m/s    LV Systolic Volume 62.99 mL    LV Systolic Volume Index 28.6 mL/m2    LV Diastolic Volume 117.11 mL    LV Diastolic Volume Index 53.23 mL/m2    LA Volume Index 31.0 mL/m2    LV Mass Index 138 g/m2    RA Major Axis 5.16 cm    Left Atrium Minor Axis 4.10 cm    Left Atrium Major Axis 6.90 cm     Triscuspid Valve Regurgitation Peak Gradient 12 mmHg    RA Width 3.74 cm    EF 50 %    Narrative    · Technically difficult study.  · The left ventricle is normal in size with mild concentric hypertrophy   and low normal systolic function.  · The estimated ejection fraction is 50-55%.  · Normal left ventricular diastolic function.  · Normal right ventricular size with normal right ventricular systolic   function.  · There is no pulmonary hypertension.  · Mechanically ventilated; cannot use inferior caval vein diameter to   estimate central venous pressure.        Assessment and Plan:     Brief HPI:     * Cardiac arrest  VF arrest with ROSC after ACLS  CAHP score suggests good prognosis.  Initial concern for STEMI, ST segments have come down with medical stabilization  Admit pt to ICU on hospitalist svc  Cont IV amio/heparin  Cont ASA/Plavix/statin  Check echo  Cath pending clinical course and neurologic improvement    12/02/2022 - follow-up EKG does not show ST elevation.  Troponin has increased to 32.  Continue ASA/Plavix. Continue ASA. Echocardiogram showed ejection fraction of roughly 50%.  As long as hemodynamics maintained no need for emergent catheterization at this point.  Correct electrolytes.    12/03/2022-patient has been extubated.  At some point we plan on coronary angiography.  His creatinine has increased to 2.9.  He is hemodynamically stable.  Continue aspirin/Plavix.  Discontinue IV amiodarone.  Can start p.o. amiodarone at 400 mg p.o. q.d..  Stable for transfer to telemetry.    12/04/2022-patient stable.  Continue medical management.  As creatinine improves plans for cardiac catheterization.  Aspirin/Plavix.  Statin.  Amiodarone secondary to VFib arrest.    PRAKASH (acute kidney injury)  12/02/2022-in setting of cardiac arrest.  Hypotension.  Continue pressors.  IV fluids.  Monitor.    12/03/2022-creatinine 2.9 today.  Likely ATN. Nephrology consult.    Four hundred twenty-two-creatinine 2.4 today.   Trending down.  Monitor.        VTE Risk Mitigation (From admission, onward)         Ordered     heparin 25,000 units in dextrose 5% (100 units/ml) IV bolus from bag - ADDITIONAL PRN BOLUS - 60 units/kg (max bolus 4000 units)  As needed (PRN)        Question:  Heparin Infusion Adjustment (DO NOT MODIFY ANSWER)  Answer:  [email or web address]\Images\Pharmacy\HeparinInfusions\heparin LOW INTENSITY nomogram for [facility] [id number].pdf    12/01/22 2302     heparin 25,000 units in dextrose 5% (100 units/ml) IV bolus from bag - ADDITIONAL PRN BOLUS - 30 units/kg (max bolus 4000 units)  As needed (PRN)        Question:  Heparin Infusion Adjustment (DO NOT MODIFY ANSWER)  Answer:  [email or web address]\[employer]\Images\Pharmacy\HeparinInfusions\heparin LOW INTENSITY nomogram for [facility] [id number].pdf    12/01/22 2302     IP VTE HIGH RISK PATIENT  Once         12/01/22 2332     Place sequential compression device  Until discontinued         12/01/22 2332     heparin 25,000 units in dextrose 5% 250 mL (100 units/mL) infusion LOW INTENSITY nomogram - [facility]  Continuous        Question Answer Comment   Heparin Infusion Adjustment (DO NOT MODIFY ANSWER) \\[email or web address]\[employer]\Images\Pharmacy\HeparinInfusions\heparin LOW INTENSITY nomogram for [facility] [id number].pdf    Begin at (in units/kg/hr) 12        12/01/22 2302                Sherman Hussein MD  Cardiology  [facility] - [place] - Telemetry

## 2022-12-04 NOTE — CONSULTS
"  [facility] - Telemetry  Adult Nutrition  Consult Note    SUMMARY     Recommendations    Recommendation:   1. Continue current cardiac diet with texture/consistency modifications per SLP/MD.   2. Monitor weight/labs.   3. RD to follow and monitor intake    Goals:   Pt intake to remain >/= 75% EEN/EPN by RD follow up    Nutrition Goal Status: new  Communication of RD Recs: other (comment) (POC)    Assessment and Plan    Nutrition Problem  Decreased nutrient needs, protein    Related to (etiology):   PRAKASH    Signs and Symptoms (as evidenced by):   Pt with PRAKASH s/p cardiac arrest.     Interventions:  Collaboration with other providers    Nutrition Diagnosis Status:   New      Reason for Assessment    Reason For Assessment: consult (Eval and Treat)  Diagnosis: other (see comments) (Cardiac Arrest)  Relevant Medical History: Hypercholesteremia, pulmonary embolism, HTN, Anxiety  Interdisciplinary Rounds: did not attend  General Information Comments: Pt receiving dyphagia mech soft, cardiac diet with % intake recored. Pt was  extubated on 12/2. PIV, PICC. Kurtis Score: 19 NFPE not completed 2/2 remote weekend coverage  Nutrition Discharge Planning: d/c on cardiac diet with texture/consistency modifications per SLP/MD    Nutrition Risk Screen    Nutrition Risk Screen: no indicators present    Nutrition/Diet History    Food Preferences: TY  Food Allergies: NKFA  Factors Affecting Nutritional Intake: difficulty/impaired swallowing    Anthropometrics    Temp: 99.9 °F (37.7 °C)  Height Method: Estimated  Height: 5' 7" (170.2 cm)  Height (inches): 67 in  Weight Method: Bed Scale  Weight: 111.1 kg (244 lb 14.9 oz)  Weight (lb): 244.93 lb  Ideal Body Weight (IBW), Male: 148 lb  % Ideal Body Weight, Male (lb): 165.49 %  BMI (Calculated): 38.4  BMI Grade: 35 - 39.9 - obesity - grade II       Lab/Procedures/Meds    Pertinent Labs Reviewed: reviewed  Pertinent Labs Comments: K 3.0, BUN 37, Cr 2.4, eGFR 30, Glu 113, Ca 8.3, Alb " 3.3, Bili 1.3, ,   Pertinent Medications Reviewed: reviewed  Pertinent Medications Comments: amiodarone, aspirin, statin, clopidogrel, famotidine, NaCl        Estimated/Assessed Needs    Weight Used For Calorie Calculations: 111.1 kg (244 lb 14.9 oz)  Energy Calorie Requirements (kcal): 1885  Energy Need Method: Jay-St Jeor (x 1.0 2/2 BMI of 38)  Protein Requirements: 67-89 (0.6-0.8 gm/kg 2/2 PRAKASH)  Weight Used For Protein Calculations: 111.1 kg (244 lb 14.9 oz)     Estimated Fluid Requirement Method: RDA Method (or PER MD)  RDA Method (mL): 1885         Nutrition Prescription Ordered    Current Diet Order[facility]    Evaluation of Received Nutrient/Fluid Intake    I/O: 2277.1/2540  Energy Calories Required: meeting needs  Protein Required: meeting needs  Fluid Required: meeting needs  Comments: LBM 12/1  % Intake of Estimated Energy Needs: 75 - 100 %  % Meal Intake: 75 - 100 %    Nutrition Risk    Level of Risk/Frequency of Follow-up:  (2x/week)       Monitor and Evaluation    Food and Nutrient Intake: energy intake, food and beverage intake  Food and Nutrient Adminstration: diet order  Knowledge/Beliefs/Attitudes: food and nutrition knowledge/skill  Physical Activity and Function: nutrition-related ADLs and IADLs  Anthropometric Measurements: weight, weight change, body mass index  Biochemical Data, Medical Tests and Procedures: electrolyte and renal panel, gastrointestinal profile, glucose/endocrine profile, inflammatory profile, lipid profile       Nutrition Follow-Up    RD Follow-up?: Yes

## 2022-12-04 NOTE — PLAN OF CARE
Problem: Infection  Goal: Absence of Infection Signs and Symptoms  Outcome: Ongoing, Progressing     Problem: Adult Inpatient Plan of Care  Goal: Plan of Care Review  Outcome: Ongoing, Progressing  Goal: Patient-Specific Goal (Individualized)  Outcome: Ongoing, Progressing  Goal: Absence of Hospital-Acquired Illness or Injury  Outcome: Ongoing, Progressing  Goal: Optimal Comfort and Wellbeing  Outcome: Ongoing, Progressing     Problem: Bariatric Environmental Safety  Goal: Safety Maintained with Care  Outcome: Ongoing, Progressing     Problem: Fall Injury Risk  Goal: Absence of Fall and Fall-Related Injury  Outcome: Ongoing, Progressing     Problem: Skin Injury Risk Increased  Goal: Skin Health and Integrity  Outcome: Ongoing, Progressing     Problem: Oral Intake Inadequate (Acute Kidney Injury/Impairment)  Goal: Optimal Nutrition Intake  Outcome: Ongoing, Progressing     Problem: Renal Function Impairment (Acute Kidney Injury/Impairment)  Goal: Effective Renal Function  Outcome: Ongoing, Progressing

## 2022-12-04 NOTE — PLAN OF CARE
Recommendation:   1. Continue current cardiac diet with texture/consistency modifications per SLP/MD.   2. Monitor weight/labs.   3. RD to follow and monitor intake    Goals:   Pt intake to remain >/= 75% EEN/EPN by RD follow up    Nutrition Goal Status: new  Communication of RD Recs: other (comment) (POC)      Problem: Oral Intake Inadequate (Acute Kidney Injury/Impairment)  Goal: Optimal Nutrition Intake  Outcome: Ongoing, Progressing

## 2022-12-04 NOTE — SUBJECTIVE & OBJECTIVE
Interval History: Pt states he has some soreness in his chest, but otherwise he is happy to be alive. Pt denies sob, cough, fever, chills, n/v at this time.     Review of Systems  Objective:     Vital Signs (Most Recent):  Temp: 98.7 °F (37.1 °C) (12/04/22 0722)  Pulse: 68 (12/04/22 0722)  Resp: 16 (12/04/22 0722)  BP: (!) 148/87 (12/04/22 0722)  SpO2: (!) 94 % (12/04/22 0722)   Vital Signs (24h Range):  Temp:  [98.2 °F (36.8 °C)-100.7 °F (38.2 °C)] 98.7 °F (37.1 °C)  Pulse:  [61-73] 68  Resp:  [15-27] 16  SpO2:  [91 %-97 %] 94 %  BP: (108-148)/(62-87) 148/87     Weight: 111.1 kg (244 lb 14.9 oz)  Body mass index is 38.36 kg/m².    Intake/Output Summary (Last 24 hours) at 12/4/2022 1000  Last data filed at 12/4/2022 0935  Gross per 24 hour   Intake 2037.12 ml   Output 2840 ml   Net -802.88 ml      Physical Exam  Constitutional:       General: He is not in acute distress.     Appearance: He is obese. He is not ill-appearing, toxic-appearing or diaphoretic.   HENT:      Head: Normocephalic and atraumatic.      Nose: Nose normal.      Mouth/Throat:      Mouth: Mucous membranes are moist.   Eyes:      General: No scleral icterus.     Extraocular Movements: Extraocular movements intact.   Cardiovascular:      Rate and Rhythm: Normal rate and regular rhythm.      Heart sounds: No murmur heard.    No friction rub. No gallop.   Pulmonary:      Effort: No respiratory distress.      Breath sounds: No wheezing.   Abdominal:      General: Abdomen is flat. Bowel sounds are normal. There is no distension.      Palpations: Abdomen is soft.      Tenderness: There is no abdominal tenderness.   Musculoskeletal:         General: Normal range of motion.      Cervical back: Normal range of motion.      Right lower leg: No edema.      Left lower leg: No edema.   Skin:     General: Skin is warm.   Neurological:      General: No focal deficit present.      Mental Status: He is alert.   Significant Labs: All pertinent labs within the past  24 hours have been reviewed.    Significant Imaging: I have reviewed all pertinent imaging results/findings within the past 24 hours.

## 2022-12-04 NOTE — PLAN OF CARE
Problem: Adult Inpatient Plan of Care  Goal: Plan of Care Review  Outcome: Ongoing, Progressing  Flowsheets (Taken 12/4/2022 1734)  Plan of Care Reviewed With: patient  Goal: Patient-Specific Goal (Individualized)  Outcome: Ongoing, Progressing  Flowsheets (Taken 12/4/2022 1734)  Anxieties, Fears or Concerns: patient concern about temperture in room  Individualized Care Needs: thermostat temperture in room increased to promote pt's comfort  Patient-Specific Goals (Include Timeframe): Nasal cannula readjusted and reapplied  Goal: Optimal Comfort and Wellbeing  Outcome: Ongoing, Progressing     Problem: Bariatric Environmental Safety  Goal: Safety Maintained with Care  Outcome: Ongoing, Progressing     Problem: Fall Injury Risk  Goal: Absence of Fall and Fall-Related Injury  Outcome: Ongoing, Progressing

## 2022-12-04 NOTE — PROGRESS NOTES
[facility] Medicine  Progress Note    Patient Name: Charles Kapoor  MRN: [id number]  Patient Class: IP- Inpatient   Admission Date: 12/1/2022  Length of Stay: 3 days  Attending Physician: García Mark III, MD  Primary Care Provider: [facility]        Subjective:     Principal Problem:Cardiac arrest        HPI:  This is a 61 year old male with a PMHx of hypertension, HLD who presented with cardiac arrest.     Patient presented to the ED complaining of chest pain. Shortly after arrival, he was noted to be snoring, urinated on himself and was thought to have a seizure. He was hooked up to the monitor and was noted to be in torsade. He later went into cardiac arrest and resuscitated per ACLS. Rhythm was ventricular fibirlation. He recieved epinephrine x 5, calcium chloride, amiodarone, magnseium sulfate x2, sodium bicarbonate x1 and was defibrillated x4. Initial EKG appeared to show ST elevations.  Case was reviewed with Cardiology, with initial plans to activate the cath lab, however, given improvement of ST elevations later, no intervention was performed. He was given aspirin, plavix, heparin and was started on amiodarone drip. A PICC line was ordered. Labs were remarkable for hypokalemia (2.9- hemolyzed specimen), elevated troponin (0.09), ABG showing a PH of 7.2, PCO2: 48, HCO3: 19 and an AG of 19 and an elevated lactic acid (6.1). The patient required escalation of his sedation due to agitation, however, was not purposefully following commands.He was admitted to the ICU for further management.       Overview/Hospital Course:  62 y/o male admitted with VF arrest with ROSC after ACLS.  Initially noted to be in torsade and eventually cardiac arrest.  Noted to be in v. Fib and underwent ACLS with epi x 5, calcium chloride, amiodarone bolus, magnesium sulfate x 2, sodium bicarbonate x 1 and was defibrillated x 4 (2 x 200 and 2 x 400 joules) with eventual ROSC.  Initial concern for  STEMI and Cardiology consulted.  ST segments have come down with medical stabilization.  Hypokalemia replaced.  Troponin up to 32.  Started on Amio and Heparin drips.  ASA, Statin and Plavix.  EF of 55% on Echo.  Pulmonary consulted for vent management.  Adequate mentation and patient extubated on 12/2.  ARF, but adequate urine output.      Interval History: Pt states he has some soreness in his chest, but otherwise he is happy to be alive. Pt denies sob, cough, fever, chills, n/v at this time.     Review of Systems  Objective:     Vital Signs (Most Recent):  Temp: 98.7 °F (37.1 °C) (12/04/22 0722)  Pulse: 68 (12/04/22 0722)  Resp: 16 (12/04/22 0722)  BP: (!) 148/87 (12/04/22 0722)  SpO2: (!) 94 % (12/04/22 0722)   Vital Signs (24h Range):  Temp:  [98.2 °F (36.8 °C)-100.7 °F (38.2 °C)] 98.7 °F (37.1 °C)  Pulse:  [61-73] 68  Resp:  [15-27] 16  SpO2:  [91 %-97 %] 94 %  BP: (108-148)/(62-87) 148/87     Weight: 111.1 kg (244 lb 14.9 oz)  Body mass index is 38.36 kg/m².    Intake/Output Summary (Last 24 hours) at 12/4/2022 1000  Last data filed at 12/4/2022 0935  Gross per 24 hour   Intake 2037.12 ml   Output 2840 ml   Net -802.88 ml      Physical Exam  Constitutional:       General: He is not in acute distress.     Appearance: He is obese. He is not ill-appearing, toxic-appearing or diaphoretic.   HENT:      Head: Normocephalic and atraumatic.      Nose: Nose normal.      Mouth/Throat:      Mouth: Mucous membranes are moist.   Eyes:      General: No scleral icterus.     Extraocular Movements: Extraocular movements intact.   Cardiovascular:      Rate and Rhythm: Normal rate and regular rhythm.      Heart sounds: No murmur heard.    No friction rub. No gallop.   Pulmonary:      Effort: No respiratory distress.      Breath sounds: No wheezing.   Abdominal:      General: Abdomen is flat. Bowel sounds are normal. There is no distension.      Palpations: Abdomen is soft.      Tenderness: There is no abdominal tenderness.  "  Musculoskeletal:         General: Normal range of motion.      Cervical back: Normal range of motion.      Right lower leg: No edema.      Left lower leg: No edema.   Skin:     General: Skin is warm.   Neurological:      General: No focal deficit present.      Mental Status: He is alert.   Significant Labs: All pertinent labs within the past 24 hours have been reviewed.    Significant Imaging: I have reviewed all pertinent imaging results/findings within the past 24 hours.      Assessment/Plan:      * Cardiac arrest  Presented with chest pain   Had a "seizure like" event in the ED, noted to be snoring and urinated on himself  Initial rhythm was torsade and later went into vfib requiring CPR, defibrillation   EKG showed ST elevation, cardiology evaluated the patient with initial plans to activate the cath lab, however, later his ST elevations improved and an intervention was deferred at this time.   Agitated, fighting sedation post arrest   DDx: likely cardiac etiology; ACS or arrhythmias 2/2 electrolyte derangements (likely hypokalemia).   Cardiology consulted.  Planning LHC once medically stable.  Appropriate mentation and extubated on 12/2.    NSTEMI (non-ST elevated myocardial infarction)  LHC on Monday if renal function ok  Continue ASA, Statin, Plavix and Heparin drip.      Elevated LFTs  Possibly slight shock liver post arrest.  Continue to monitor.      Lactic acidosis  Patient has cardiac arrest.  Improving.  Hemodynamically stable.      Hypophosphatemia  Replace as necessary.    Hypokalemia  Replace as necessary.      Acute hypoxemic respiratory failure  Intubated with cardiac arrest.  Adequate oxygenation.  Adequate mentation and extubated on 12/2  Currently doing well on NC    PRAKASH (acute kidney injury)  Patient with acute kidney injury likely due to acute tubular necrosis PRAKASH is currently improving. Labs reviewed- Renal function/electrolytes with Estimated Creatinine Clearance: 39.4 mL/min (A) (based on " SCr of 2.4 mg/dL (H)). according to latest data. Monitor urine output and serial BMP and adjust therapy as needed. Avoid nephrotoxins and renally dose meds for GFR listed above.   -crt trending down this morning, but in setting of needing C will consult nephrology to see if they have any recommendations.      VTE Risk Mitigation (From admission, onward)         Ordered     heparin 25,000 units in dextrose 5% (100 units/ml) IV bolus from bag - ADDITIONAL PRN BOLUS - 60 units/kg (max bolus 4000 units)  As needed (PRN)        Question:  Heparin Infusion Adjustment (DO NOT MODIFY ANSWER)  Answer:  \\[email or web address]\epic\Images\Pharmacy\HeparinInfusions\heparin LOW INTENSITY nomogram for [facility] [id number].pdf    12/01/22 2302     heparin 25,000 units in dextrose 5% (100 units/ml) IV bolus from bag - ADDITIONAL PRN BOLUS - 30 units/kg (max bolus 4000 units)  As needed (PRN)        Question:  Heparin Infusion Adjustment (DO NOT MODIFY ANSWER)  Answer:  \\[email or web address]\epic\Images\Pharmacy\HeparinInfusions\heparin LOW INTENSITY nomogram for [facility] [id number].pdf    12/01/22 2302     IP VTE HIGH RISK PATIENT  Once         12/01/22 2332     Place sequential compression device  Until discontinued         12/01/22 2332     heparin 25,000 units in dextrose 5% 250 mL (100 units/mL) infusion LOW INTENSITY nomogram - [facility]  Continuous        Question Answer Comment   Heparin Infusion Adjustment (DO NOT MODIFY ANSWER) \\[email or web address]\epic\Images\Pharmacy\HeparinInfusions\heparin LOW INTENSITY nomogram for [facility] [id number].pdf    Begin at (in units/kg/hr) 12        12/01/22 2302                Discharge Planning   UMBERTO:      Code Status: Full Code   Is the patient medically ready for discharge?:     Reason for patient still in hospital (select all that apply): Treatment and Consult recommendations  Discharge Plan A:  (tbd)                  García Mark III, MD  Department of [facility] Medicine   [facility]

## 2022-12-04 NOTE — NURSING TRANSFER
Nursing Transfer Note      12/3/2022     Reason patient is being transferred: no longer meets ICU criteria    Transfer From: 267 to 312    Transfer via bed    Transfer with O2, cardiac monitoring    Transported by RN and transport personnel    Medicines sent: Heparin gtt    Any special needs or follow-up needed: n/a    Chart send with patient: Yes    Notified: pt will notify family    Patient reassessed at: 12/3/22 at 2230     Upon arrival to floor: cardiac monitor applied, patient oriented to room, call bell in reach, and bed in lowest position, RN at bedside

## 2022-12-04 NOTE — SUBJECTIVE & OBJECTIVE
Interval History: No events overnight    Review of Systems   Constitutional: Negative for diaphoresis and malaise/fatigue.   HENT:  Negative for congestion, hearing loss, hoarse voice, nosebleeds, odynophagia, stridor and tinnitus.    Eyes:  Negative for blurred vision, double vision, photophobia, visual disturbance and visual halos.   Cardiovascular:  Negative for chest pain, dyspnea on exertion, irregular heartbeat, leg swelling, near-syncope, orthopnea, palpitations, paroxysmal nocturnal dyspnea and syncope.   Respiratory:  Negative for shortness of breath, sputum production and wheezing.    Musculoskeletal:  Negative for falls, joint pain, muscle cramps, muscle weakness, myalgias, neck pain and stiffness.   Gastrointestinal:  Negative for abdominal pain, heartburn, hematemesis and melena.   Neurological:  Negative for disturbances in coordination, dizziness, focal weakness, headaches, light-headedness, loss of balance, numbness, paresthesias, seizures, sensory change, tremors, vertigo and weakness.   Psychiatric/Behavioral:  Negative for altered mental status, depression, hallucinations and memory loss. The patient does not have insomnia and is not nervous/anxious.    Objective:     Vital Signs (Most Recent):  Temp: 99 °F (37.2 °C) (12/04/22 1111)  Pulse: 70 (12/04/22 1111)  Resp: 16 (12/04/22 1111)  BP: 138/78 (12/04/22 1111)  SpO2: (!) 93 % (12/04/22 1111)   Vital Signs (24h Range):  Temp:  [98.2 °F (36.8 °C)-100.7 °F (38.2 °C)] 99 °F (37.2 °C)  Pulse:  [61-72] 70  Resp:  [15-24] 16  SpO2:  [91 %-97 %] 93 %  BP: (111-148)/(62-87) 138/78     Weight: 111.1 kg (244 lb 14.9 oz)  Body mass index is 38.36 kg/m².     SpO2: (!) 93 %  O2 Device (Oxygen Therapy): nasal cannula      Intake/Output Summary (Last 24 hours) at 12/4/2022 1218  Last data filed at 12/4/2022 0935  Gross per 24 hour   Intake 2037.12 ml   Output 2125 ml   Net -87.88 ml       Lines/Drains/Airways       Peripherally Inserted Central Catheter Line   Duration             PICC Triple Lumen 12/02/22 0233 right basilic 2 days              Peripheral Intravenous Line  Duration                  Peripheral IV - Single Lumen 12/01/22 2215 20 G Right Antecubital 2 days                    Physical Exam  Vitals reviewed.   Constitutional:       General: He is not in acute distress.     Appearance: He is obese. He is not diaphoretic.   Neck:      Vascular: No carotid bruit or JVD.   Cardiovascular:      Rate and Rhythm: Normal rate and regular rhythm.      Pulses: Normal pulses.   Pulmonary:      Effort: Pulmonary effort is normal.      Breath sounds: Normal breath sounds.   Abdominal:      General: Bowel sounds are normal.      Palpations: Abdomen is soft.      Tenderness: There is no abdominal tenderness.   Musculoskeletal:      Right lower leg: No edema.      Left lower leg: No edema.   Neurological:      Mental Status: He is alert and oriented to person, place, and time.   Psychiatric:         Speech: Speech normal.         Behavior: Behavior normal.       Significant Labs: CMP   Recent Labs   Lab 12/03/22  0135 12/03/22  0852 12/04/22  0208    140 140   K 3.0* 2.8* 3.0*    104 104   CO2 25 23 25   * 174* 113*   BUN 41* 42* 37*   CREATININE 2.9* 2.9* 2.4*   CALCIUM 8.3* 8.0* 8.3*   PROT 6.4  --  6.4   ALBUMIN 3.5  --  3.3*   BILITOT 0.7  --  1.3*   ALKPHOS 101  --  82   *  --  176*   *  --  112*   ANIONGAP 12 13 11   , CBC   Recent Labs   Lab 12/03/22  0614 12/04/22  0541   WBC 11.98 10.17   HGB 12.5* 12.6*   HCT 38.0* 36.6*   * 145*   , Lipid Panel No results for input(s): CHOL, HDL, LDLCALC, TRIG, CHOLHDL in the last 48 hours., and Troponin No results for input(s): TROPONINI in the last 48 hours.    Significant Imaging: Echocardiogram: Transthoracic echo (TTE) complete (Cupid Only):   Results for orders placed or performed during the hospital encounter of 12/01/22   Echo   Result Value Ref Range    BSA 2.29 m2    TDI SEPTAL  0.05 m/s    LV LATERAL E/E' RATIO 10.50 m/s    LV SEPTAL E/E' RATIO 12.60 m/s    LA WIDTH 4.10 cm    IVC diameter 2.74 cm    Left Ventricular Outflow Tract Mean Velocity 0.79 cm/s    Left Ventricular Outflow Tract Mean Gradient 2.82 mmHg    AORTIC VALVE CUSP SEPERATION 2.25 cm    TDI LATERAL 0.06 m/s    PV PEAK VELOCITY 0.85 cm/s    LVIDd 4.98 3.5 - 6.0 cm    IVS 1.51 (A) 0.6 - 1.1 cm    Posterior Wall 1.38 (A) 0.6 - 1.1 cm    Ao root annulus 2.94 cm    LVIDs 3.83 2.1 - 4.0 cm    FS 23 28 - 44 %    LA volume 68.12 cm3    Sinus 3.06 cm    Ascending aorta 3.49 cm    LV mass 303.40 g    LA size 3.80 cm    TAPSE 2.02 cm    Left Ventricle Relative Wall Thickness 0.55 cm    AV mean gradient 4 mmHg    AV valve area 2.81 cm2    AV Velocity Ratio 0.84     AV index (prosthetic) 0.86     MV valve area p 1/2 method 2.10 cm2    E/A ratio 0.81     Mean e' 0.06 m/s    E wave deceleration time 198.60 msec    LVOT diameter 2.04 cm    LVOT area 3.3 cm2    LVOT peak jorge l 1.08 m/s    LVOT peak VTI 21.10 cm    Ao peak jorge l 1.28 m/s    Ao VTI 24.5 cm    LVOT stroke volume 68.93 cm3    AV peak gradient 7 mmHg    E/E' ratio 11.45 m/s    MV Peak E Jorge L 0.63 m/s    TR Max Jorge L 1.70 m/s    MV stenosis pressure 1/2 time 105.01 ms    MV Peak A Jorge L 0.78 m/s    LV Systolic Volume 62.99 mL    LV Systolic Volume Index 28.6 mL/m2    LV Diastolic Volume 117.11 mL    LV Diastolic Volume Index 53.23 mL/m2    LA Volume Index 31.0 mL/m2    LV Mass Index 138 g/m2    RA Major Axis 5.16 cm    Left Atrium Minor Axis 4.10 cm    Left Atrium Major Axis 6.90 cm    Triscuspid Valve Regurgitation Peak Gradient 12 mmHg    RA Width 3.74 cm    EF 50 %    Narrative    · Technically difficult study.  · The left ventricle is normal in size with mild concentric hypertrophy   and low normal systolic function.  · The estimated ejection fraction is 50-55%.  · Normal left ventricular diastolic function.  · Normal right ventricular size with normal right ventricular systolic    function.  · There is no pulmonary hypertension.  · Mechanically ventilated; cannot use inferior caval vein diameter to   estimate central venous pressure.

## 2022-12-04 NOTE — ASSESSMENT & PLAN NOTE
Patient with acute kidney injury likely due to acute tubular necrosis PRAKASH is currently improving. Labs reviewed- Renal function/electrolytes with Estimated Creatinine Clearance: 39.4 mL/min (A) (based on SCr of 2.4 mg/dL (H)). according to latest data. Monitor urine output and serial BMP and adjust therapy as needed. Avoid nephrotoxins and renally dose meds for GFR listed above.   -crt trending down this morning, but in setting of needing LHC will consult nephrology to see if they have any recommendations.

## 2022-12-04 NOTE — ASSESSMENT & PLAN NOTE
12/02/2022-in setting of cardiac arrest.  Hypotension.  Continue pressors.  IV fluids.  Monitor.    12/03/2022-creatinine 2.9 today.  Likely ATN. Nephrology consult.    Four hundred twenty-two-creatinine 2.4 today.  Trending down.  Monitor.

## 2022-12-04 NOTE — ASSESSMENT & PLAN NOTE
Intubated with cardiac arrest.  Adequate oxygenation.  Adequate mentation and extubated on 12/2  Currently doing well on NC

## 2022-12-04 NOTE — NURSING
Pt arrived on the unit from ICU accompanied by RN and transport.  Pt AAOx4.  Resp even NL.  Pt denies pain/sob.  Rt FA IV infusing Heparin at 18u/kg/hr.  Plan of care discussed with pt.  Advised pt to call for assistance.  Call light in reach, bed alarm on.

## 2022-12-04 NOTE — ASSESSMENT & PLAN NOTE
VF arrest with ROSC after ACLS  CAHP score suggests good prognosis.  Initial concern for STEMI, ST segments have come down with medical stabilization  Admit pt to ICU on hospitalist svc  Cont IV amio/heparin  Cont ASA/Plavix/statin  Check echo  Cath pending clinical course and neurologic improvement    12/02/2022 - follow-up EKG does not show ST elevation.  Troponin has increased to 32.  Continue ASA/Plavix. Continue ASA. Echocardiogram showed ejection fraction of roughly 50%.  As long as hemodynamics maintained no need for emergent catheterization at this point.  Correct electrolytes.    12/03/2022-patient has been extubated.  At some point we plan on coronary angiography.  His creatinine has increased to 2.9.  He is hemodynamically stable.  Continue aspirin/Plavix.  Discontinue IV amiodarone.  Can start p.o. amiodarone at 400 mg p.o. q.d..  Stable for transfer to telemetry.    12/04/2022-patient stable.  Continue medical management.  As creatinine improves plans for cardiac catheterization.  Aspirin/Plavix.  Statin.  Amiodarone secondary to VFib arrest.

## 2022-12-05 NOTE — NURSING
0739 Code paged  0740 Code Card, pads attached   0741 Pulse achieved, no bp   Code team 0743  Ekg 0749    Bg 141  0748   0748 Bagging   Code restarted 0749  0750 epi  0752 Atropine  0755 Compressions   0753 Pulse check   0754 Sinus tach 162  0755 sodium bicarb  0758 ICU bed assigned     0759 /69 93%,     0802 Compressions resumed   Epi 0803  0804 Compression switch   0805 Pulse check   0805 bicarb   0805 Eamon placed   0807 Pulse check, epi   0809 Pulse check   0809 PEA   0809 Eamon restarted   0809 Bolus, pulse check   0810 Compressions started   0810 Carotid Pulse felt     0811 Pulse Check, Epi   0813 Pulse check   0813 Procedure (pericardial effusion), Dopamine, HR 57  0815 epi, compressions restarted   0816 pulse check,   0819 Pulse check, , no pulse, switch compressors  0819 epi  0820 intubation  0821 no pulse   0822 Atropine   0823 Pulse check, compressions, epi  Compressions stopped, procedure resumed  0825  pulse check, epi  0826 Compressions   0828 pulse check, no rhythm, compressions, epi   0830 compressions   0831 compressions stopped, epi   0833 Time of Death

## 2022-12-05 NOTE — NURSING
Patient belongings including cell phone given to security per wife's request. She will collect when she returns from [facility].

## 2022-12-05 NOTE — ANESTHESIA PROCEDURE NOTES
Ad Hoc Intubation    Date/Time: 12/5/2022 8:25 AM  Performed by: Jamir Michael MD  Authorized by: Jamir Michael MD     Indications:  Respiratory failure  Diagnosis:  Cardiac arrest  Patient Location:  Floor  Timeout:  12/5/2022 8:25 AM  Procedure Start Time:  12/5/2022 8:20 AM  Procedure End Time:  12/5/2022 8:30 AM  Staff:     Anesthesiologist Present: Yes    Intubation:     Induction:  Intravenous    Intubated:  Postinduction    Mask Ventilation:  Easy mask    Attempts:  1    Attempted By:  Staff anesthesiologist    Method of Intubation:  Video laryngoscopy    Blade:  Shine 3    Laryngeal View Grade: Grade I - full view of chords      Difficult Airway Encountered?: No      Complications:  None    Airway Device:  Oral endotracheal tube    Airway Device Size:  7.0    Style/Cuff Inflation:  Cuffed    Inflation Amount (mL):  6    Tube secured:  25    Placement Verified By:  Capnometry    Findings Post-Intubation:  BS equal bilateral  Notes:      Pt respiratory failure combative..induced with anectine no other meds due to poor pulse

## 2022-12-05 NOTE — NURSING
0739- Pt called via call light and said one word to the unit secretary but she couldn't comprehend what he said. Nurse immediately went to the room, pt noted altered from baseline, rapid response called.     0740- Pt breathing noted agonal, code called.

## 2022-12-05 NOTE — ANESTHESIA PREPROCEDURE EVALUATION
12/05/2022  Charles Kapoor is a 61 y.o., male.      Pre-op Assessment          Review of Systems  Anesthesia Hx:  No previous Anesthesia    Social:  Non-Smoker    Hematology/Oncology:  Hematology Normal   Oncology Normal     EENT/Dental:EENT/Dental Normal   Cardiovascular:   Hypertension Past MI  Cardiac tamponade noted during code per cardiology   Pulmonary:  Pulmonary Normal    Renal/:   Chronic Renal Disease    Hepatic/GI:  Hepatic/GI Normal    Musculoskeletal:  Musculoskeletal Normal    Neurological:  Neurology Normal    Endocrine:  Endocrine Normal    Dermatological:  Skin Normal    Psych:  Psychiatric Normal               .

## 2022-12-05 NOTE — PLAN OF CARE
Problem: Infection  Goal: Absence of Infection Signs and Symptoms  Outcome: Ongoing, Progressing     Problem: Adult Inpatient Plan of Care  Goal: Plan of Care Review  Outcome: Ongoing, Progressing  Goal: Patient-Specific Goal (Individualized)  Outcome: Ongoing, Progressing  Goal: Absence of Hospital-Acquired Illness or Injury  Outcome: Ongoing, Progressing  Goal: Optimal Comfort and Wellbeing  Outcome: Ongoing, Progressing     Problem: Fall Injury Risk  Goal: Absence of Fall and Fall-Related Injury  Outcome: Ongoing, Progressing     Problem: Skin Injury Risk Increased  Goal: Skin Health and Integrity  Outcome: Ongoing, Progressing     Problem: Fluid and Electrolyte Imbalance (Acute Kidney Injury/Impairment)  Goal: Fluid and Electrolyte Balance  Outcome: Ongoing, Progressing     Problem: Oral Intake Inadequate (Acute Kidney Injury/Impairment)  Goal: Optimal Nutrition Intake  Outcome: Ongoing, Progressing     Problem: Renal Function Impairment (Acute Kidney Injury/Impairment)  Goal: Effective Renal Function  Outcome: Ongoing, Progressing      negative...

## 2022-12-05 NOTE — NURSING
Bedside handoff report received from off going nurse. Pt lying in bed, NAD noted. Pt AAO x4. Fall/safety precautions maintained. Call light and personal belongings within reach. Communication board updated. Will continue to monitor.

## 2022-12-05 NOTE — NURSING
Brother Jose Contacted and updated on patient condition by m.d. Brother will notify spouse and on way to hospital.

## 2022-12-05 NOTE — SIGNIFICANT EVENT
Code Blue was called duo to cardiac arrest,patient was started on CPR immediately,PEA on monitor,patient twice had ROSC,but again had cardiac arrest,was started on aggressive ACLS.ER and cardiology team was present,bed side echo. Show large post MI cardiac effusion,several bed side attempt with needle for pericardiocentesis was unsuccessful,ACLS was continued,but unfortunately patient remains in cardiac arrest and pulseless,,family was notified,time of death at 08:33 .

## 2022-12-05 NOTE — NURSING
[facility] RAPID RESPONSE NURSE NOTE       Admit Date: 2022  LOS: 4  Code Status: Full Code   Date of Consult: 2022  : 1960  Age: 61 y.o.  Weight:   Wt Readings from Last 1 Encounters:   22 111.1 kg (244 lb 14.9 oz)     Sex: male  Race: White   Bed: [place] [address] A:   MRN: [id number]  Time Rapid Response Team page Received: 0740  Time Rapid Response Team at Bedside: 0742  Time Rapid Response Team left Bedside: 0900  Was the patient discharged from an ICU this admission? Yes   Was the patient discharged from a PACU within last 24 hours? No   Did the patient receive conscious sedation/general anesthesia in last 24 hours? No   Was the patient in the ED within the past 24 hours? No   Was the patient on NIPPV within the past 24 hours? No   Did this progress into an ARC or CPA?: Cardio Pulmonary Arrest  Attending Physician: Spike Zuniga MD  Primary Service: Hospitalist     SITUATION    Notified by overhead page.  Reason for alert: RR and then code blue   Called to evaluate the patient for Respiratory    Why is the patient in the hospital?: Cardiac arrest    Patient has a past medical history of Anxiety, HTN (hypertension), Hypercholesteremia, and Pulmonary embolism.    Last Vitals:  Temp: 99.1 °F (37.3 °C) (725)  Pulse: 57 (814)  Resp: 18 (725)  BP: 123/60 (815)  SpO2: 91 % (725)    24 Hours Vitals Range:  Temp:  [99 °F (37.2 °C)-99.9 °F (37.7 °C)]   Pulse:  []   Resp:  [16-19]   BP: (102-169)/(60-91)   SpO2:  [91 %-95 %]     Labs:  Recent Labs     22  0614 22  0541   WBC 11.98 10.17   HGB 12.5* 12.6*   HCT 38.0* 36.6*   * 145*       Recent Labs     22  0135 22  0852 22  0208    140 140   K 3.0* 2.8* 3.0*    104 104   CO2 25 23 25   CREATININE 2.9* 2.9* 2.4*   * 174* 113*   PHOS 7.6*  --   --    MG 2.7*  --   --         Recent Labs     22  1138 22  0754   PH 7.379 7.561*   PCO2 42.9  18.1*   PO2 82 70*   HCO3 25.3 16.3*   POCSATURATED 96 96   BE 0 -4        ASSESSMENT/INTERVENTIONS    Upon arrival to bedside chest compressions in progress. Code continued     Time of CPR initiation: 740  Time of first shock: N/A  Time of first Epinephrine dose: 742  ETCO2 utilized to guide CPR: Yes    Please see Code Blue Documentation for more details.    OUTCOME    Patient .    Disposition:  [place]    CODE TEAM MEMBERS    : Dr. Cm    Resident/KIMBER:       RRN: Katherine Lopez     BEDSIDE RN: Dayron ARAMBULA RN: Guy     RRT: Larisa     Additional staff:             DISCHARGE

## 2022-12-05 NOTE — DISCHARGE SUMMARY
[facility] Medicine  Discharge Summary      Patient Name: Charles Kapoor  MRN: [id number]  [facility]: [id number]  Patient Class: IP- Inpatient  Admission Date: 12/1/2022  Hospital Length of Stay: 4 days  Discharge Date and Time:  12/05/2022 11:24 AM  Attending Physician: Spike Zuniga MD   Discharging Provider: Spike Zuniga MD  Primary Care Provider: [facility]    Primary Care Team: Networked reference to record PCT     HPI:   This is a 61 year old male with a PMHx of hypertension, HLD who presented with cardiac arrest.     Patient presented to the ED complaining of chest pain. Shortly after arrival, he was noted to be snoring, urinated on himself and was thought to have a seizure. He was hooked up to the monitor and was noted to be in torsade. He later went into cardiac arrest and resuscitated per ACLS. Rhythm was ventricular fibirlation. He recieved epinephrine x 5, calcium chloride, amiodarone, magnseium sulfate x2, sodium bicarbonate x1 and was defibrillated x4. Initial EKG appeared to show ST elevations.  Case was reviewed with Cardiology, with initial plans to activate the cath lab, however, given improvement of ST elevations later, no intervention was performed. He was given aspirin, plavix, heparin and was started on amiodarone drip. A PICC line was ordered. Labs were remarkable for hypokalemia (2.9- hemolyzed specimen), elevated troponin (0.09), ABG showing a PH of 7.2, PCO2: 48, HCO3: 19 and an AG of 19 and an elevated lactic acid (6.1). The patient required escalation of his sedation due to agitation, however, was not purposefully following commands.He was admitted to the ICU for further management.       Procedure(s) (LRB):  Angiogram, Coronary, with Left Heart Cath (N/A)      Hospital Course:   62 y/o male admitted with VF arrest with ROSC after ACLS.  Initially noted to be in torsade and eventually cardiac arrest.  Noted to be in v. Fib and underwent  ACLS with epi x 5, calcium chloride, amiodarone bolus, magnesium sulfate x 2, sodium bicarbonate x 1 and was defibrillated x 4 (2 x 200 and 2 x 400 joules) with eventual ROSC.  Initial concern for STEMI and Cardiology consulted.  ST segments have come down with medical stabilization.  Hypokalemia replaced.  Troponin up to 32.  Started on Amio and Heparin drips.  ASA, Statin and Plavix.  EF of 55% on Echo.  Pulmonary consulted for vent management.  Adequate mentation and patient extubated on 12/2.  ARF, but adequate urine output.  Unfortunately in my first encounter with patient on 12.5.22,Code Catracho was called duo to cardiac arrest,patient was started on CPR immediately,PEA on monitor,patient twice had ROSC,but again had cardiac arrest,was started on aggressive ACLS.ER and cardiology team was present,bed side echo. Show large post MI cardiac effusion,several bed side attempt with needle for pericardiocentesis was unsuccessful,ACLS was continued,but unfortunately patient remains in cardiac arrest and pulseless,,family was notified,time of death at 08:33       Goals of Care Treatment Preferences:  Code Status: Full Code      Consults:   Consults (From admission, onward)        Status Ordering Provider     Inpatient consult to Nephrology  Once        Provider:  (Not yet assigned)    Acknowledged NADEEM LANIER III     Inpatient consult to Pulmonology  Once        Provider:  Burt Purvis MD    Completed JULIO C SIMON     Inpatient consult to Registered Dietitian/Nutritionist  Once        Provider:  (Not yet assigned)    Completed JULIO C SIMON     Inpatient consult to Cardiology  Once        Provider:  Ba Alvarado MD    Completed ALTA NAVARRO     Inpatient consult to PICC team ([facility])  Once        Provider:  (Not yet assigned)    Acknowledged JULIO C SIMON          No new Assessment & Plan notes have been filed under this hospital service since the last note was generated.  Service: [facility]  Medicine    Final Active Diagnoses:    Diagnosis Date Noted POA    PRINCIPAL PROBLEM:  Cardiac arrest [I46.9] 12/01/2022 Yes    Elevated LFTs [R79.89] 12/03/2022 No    NSTEMI (non-ST elevated myocardial infarction) [I21.4] 12/03/2022 Yes    PRAKASH (acute kidney injury) [N17.9] 12/02/2022 No    Acute hypoxemic respiratory failure [J96.01] 12/02/2022 Yes    Hypokalemia [E87.6] 12/02/2022 Yes    Hypophosphatemia [E83.39] 12/02/2022 Yes    Lactic acidosis [E87.20] 12/02/2022 Yes      Problems Resolved During this Admission:    Diagnosis Date Noted Date Resolved POA    Gross hematuria [R31.0] 12/02/2022 12/03/2022 Yes       Discharged Condition: stable    Disposition:     Follow Up:    Patient Instructions:   No discharge procedures on file.    Significant Diagnostic Studies: Labs:   BMP:   Recent Labs   Lab 12/04/22  0208   *      K 3.0*      CO2 25   BUN 37*   CREATININE 2.4*   CALCIUM 8.3*   , CMP   Recent Labs   Lab 12/04/22  0208      K 3.0*      CO2 25   *   BUN 37*   CREATININE 2.4*   CALCIUM 8.3*   PROT 6.4   ALBUMIN 3.3*   BILITOT 1.3*   ALKPHOS 82   *   *   ANIONGAP 11   , CBC   Recent Labs   Lab 12/04/22  0541   WBC 10.17   HGB 12.6*   HCT 36.6*   *    and Troponin   Recent Labs   Lab 12/01/22  2235 12/02/22  0417   TROPONINI 0.090* 32.535*     Microbiology: Blood Culture No results found for: LABBLOO  Radiology: X-Ray: CXR: X-Ray Chest 1 View (CXR): No results found for this visit on 12/01/22. and X-Ray Chest PA and Lateral (CXR): No results found for this visit on 12/01/22.  Cardiac Graphics: Echocardiogram:   2D echo with color flow doppler: No results found for this or any previous visit. and Transthoracic echo (TTE) complete (Cupid Only):   Results for orders placed or performed during the hospital encounter of 12/01/22   Echo   Result Value Ref Range    BSA 2.29 m2    TDI SEPTAL 0.05 m/s    LV LATERAL E/E' RATIO 10.50 m/s    LV SEPTAL E/E'  RATIO 12.60 m/s    LA WIDTH 4.10 cm    IVC diameter 2.74 cm    Left Ventricular Outflow Tract Mean Velocity 0.79 cm/s    Left Ventricular Outflow Tract Mean Gradient 2.82 mmHg    AORTIC VALVE CUSP SEPERATION 2.25 cm    TDI LATERAL 0.06 m/s    PV PEAK VELOCITY 0.85 cm/s    LVIDd 4.98 3.5 - 6.0 cm    IVS 1.51 (A) 0.6 - 1.1 cm    Posterior Wall 1.38 (A) 0.6 - 1.1 cm    Ao root annulus 2.94 cm    LVIDs 3.83 2.1 - 4.0 cm    FS 23 28 - 44 %    LA volume 68.12 cm3    Sinus 3.06 cm    Ascending aorta 3.49 cm    LV mass 303.40 g    LA size 3.80 cm    TAPSE 2.02 cm    Left Ventricle Relative Wall Thickness 0.55 cm    AV mean gradient 4 mmHg    AV valve area 2.81 cm2    AV Velocity Ratio 0.84     AV index (prosthetic) 0.86     MV valve area p 1/2 method 2.10 cm2    E/A ratio 0.81     Mean e' 0.06 m/s    E wave deceleration time 198.60 msec    LVOT diameter 2.04 cm    LVOT area 3.3 cm2    LVOT peak jorge l 1.08 m/s    LVOT peak VTI 21.10 cm    Ao peak jorge l 1.28 m/s    Ao VTI 24.5 cm    LVOT stroke volume 68.93 cm3    AV peak gradient 7 mmHg    E/E' ratio 11.45 m/s    MV Peak E Jorge L 0.63 m/s    TR Max Jorge L 1.70 m/s    MV stenosis pressure 1/2 time 105.01 ms    MV Peak A Jorge L 0.78 m/s    LV Systolic Volume 62.99 mL    LV Systolic Volume Index 28.6 mL/m2    LV Diastolic Volume 117.11 mL    LV Diastolic Volume Index 53.23 mL/m2    LA Volume Index 31.0 mL/m2    LV Mass Index 138 g/m2    RA Major Axis 5.16 cm    Left Atrium Minor Axis 4.10 cm    Left Atrium Major Axis 6.90 cm    Triscuspid Valve Regurgitation Peak Gradient 12 mmHg    RA Width 3.74 cm    EF 50 %    Narrative    · Technically difficult study.  · The left ventricle is normal in size with mild concentric hypertrophy   and low normal systolic function.  · The estimated ejection fraction is 50-55%.  · Normal left ventricular diastolic function.  · Normal right ventricular size with normal right ventricular systolic   function.  · There is no pulmonary hypertension.  · Mechanically  ventilated; cannot use inferior caval vein diameter to   estimate central venous pressure.          Pending Diagnostic Studies:     Procedure Component Value Units Date/Time    CBC auto differential [[id number]]     Order Status: Sent Lab Status: No result     Specimen: Blood          Medications:  Reconciled Home Medications:      Medication List      ASK your doctor about these medications    amLODIPine 10 MG tablet  Commonly known as: NORVASC  Take 1 tablet (10 mg total) by mouth once daily.     aspirin 81 MG EC tablet  Commonly known as: ECOTRIN  Take 81 mg by mouth once daily.     atenoloL 25 MG tablet  Commonly known as: TENORMIN  Take 2 tablets (50 mg total) by mouth once daily. for 7 days     HYDROcodone-acetaminophen 5-325 mg per tablet  Commonly known as: NORCO  Take 1 tablet by mouth every 4 (four) hours as needed.     ibuprofen 800 MG tablet  Commonly known as: ADVIL,MOTRIN  Take 1 tablet (800 mg total) by mouth every 6 (six) hours as needed for Pain.     pravastatin 20 MG tablet  Commonly known as: PRAVACHOL  Take 1 tablet (20 mg total) by mouth once daily.            Indwelling Lines/Drains at time of discharge:   Lines/Drains/Airways     Peripherally Inserted Central Catheter Line  Duration           PICC Triple Lumen 12/02/22 0233 right basilic 3 days          Airway  Duration              Airway Anesthesia 12/05/22 <1 day                Time spent on the discharge of patient: over 30  minutes         Spike Zuniga MD  Department of Hospital Medicine  [facility] [place] [facility]

## 2022-12-06 NOTE — PROVIDER PROGRESS NOTES - EMERGENCY DEPT.
Encounter Date: 12/1/2022    ED Physician Progress Notes        Physician Note:   Called to bedside for CODE Blue.  PT was intubated by Anesthesia.  Hospitalist and Cardiology present. ACLS followed. See Nurses notes for detains. Epinephrine given q3-5 min. Atropine given for 3 doses.  PT maintained in PEA.   Bedside US shows large pericardial effusion with RA collapsing indicating some degree of cardiac tamponade. Subzyphoid area prepped sterilely.  3.5 in 22 ga spinal needle placed into the pericardial space by my on 3 occassions with little to no fluid return. This was then attempted by Cardiology as well with US tech at the bedside and no fluid obtained. Needle was visualized in pericardial space. Fluid in the space must be clotted/loculated.  Despite these attepts pt's rhythym devoled to asystole. TOD pronounced by me at 8:33 AM.

## 2022-12-13 LAB — POCT GLUCOSE: 159 MG/DL (ref 70–110)
